# Patient Record
Sex: FEMALE | Race: WHITE | NOT HISPANIC OR LATINO | URBAN - METROPOLITAN AREA
[De-identification: names, ages, dates, MRNs, and addresses within clinical notes are randomized per-mention and may not be internally consistent; named-entity substitution may affect disease eponyms.]

---

## 2017-05-20 ENCOUNTER — EMERGENCY (EMERGENCY)
Facility: HOSPITAL | Age: 72
LOS: 1 days | Discharge: HOME | End: 2017-05-20
Attending: EMERGENCY MEDICINE | Admitting: EMERGENCY MEDICINE
Payer: MEDICARE

## 2017-05-20 VITALS
HEART RATE: 63 BPM | SYSTOLIC BLOOD PRESSURE: 129 MMHG | TEMPERATURE: 98 F | DIASTOLIC BLOOD PRESSURE: 64 MMHG | RESPIRATION RATE: 16 BRPM | OXYGEN SATURATION: 99 %

## 2017-05-20 VITALS
OXYGEN SATURATION: 99 % | DIASTOLIC BLOOD PRESSURE: 86 MMHG | HEART RATE: 57 BPM | RESPIRATION RATE: 17 BRPM | TEMPERATURE: 98 F | SYSTOLIC BLOOD PRESSURE: 117 MMHG

## 2017-05-20 DIAGNOSIS — R10.13 EPIGASTRIC PAIN: ICD-10-CM

## 2017-05-20 DIAGNOSIS — Z88.8 ALLERGY STATUS TO OTHER DRUGS, MEDICAMENTS AND BIOLOGICAL SUBSTANCES STATUS: ICD-10-CM

## 2017-05-20 DIAGNOSIS — F41.9 ANXIETY DISORDER, UNSPECIFIED: ICD-10-CM

## 2017-05-20 DIAGNOSIS — Z79.899 OTHER LONG TERM (CURRENT) DRUG THERAPY: ICD-10-CM

## 2017-05-20 LAB
ALBUMIN SERPL ELPH-MCNC: 4.5 G/DL — SIGNIFICANT CHANGE UP (ref 3.3–5)
ALP SERPL-CCNC: 77 U/L — SIGNIFICANT CHANGE UP (ref 40–120)
ALT FLD-CCNC: 14 U/L RC — SIGNIFICANT CHANGE UP (ref 10–45)
ANION GAP SERPL CALC-SCNC: 15 MMOL/L — SIGNIFICANT CHANGE UP (ref 5–17)
APTT BLD: 28.5 SEC — SIGNIFICANT CHANGE UP (ref 27.5–37.4)
AST SERPL-CCNC: 23 U/L — SIGNIFICANT CHANGE UP (ref 10–40)
BASOPHILS # BLD AUTO: 0 K/UL — SIGNIFICANT CHANGE UP (ref 0–0.2)
BASOPHILS NFR BLD AUTO: 0.6 % — SIGNIFICANT CHANGE UP (ref 0–2)
BILIRUB SERPL-MCNC: 0.3 MG/DL — SIGNIFICANT CHANGE UP (ref 0.2–1.2)
BUN SERPL-MCNC: 13 MG/DL — SIGNIFICANT CHANGE UP (ref 7–23)
CALCIUM SERPL-MCNC: 9.5 MG/DL — SIGNIFICANT CHANGE UP (ref 8.4–10.5)
CHLORIDE SERPL-SCNC: 105 MMOL/L — SIGNIFICANT CHANGE UP (ref 96–108)
CO2 SERPL-SCNC: 25 MMOL/L — SIGNIFICANT CHANGE UP (ref 22–31)
CREAT SERPL-MCNC: 0.73 MG/DL — SIGNIFICANT CHANGE UP (ref 0.5–1.3)
EOSINOPHIL # BLD AUTO: 0.1 K/UL — SIGNIFICANT CHANGE UP (ref 0–0.5)
EOSINOPHIL NFR BLD AUTO: 1.5 % — SIGNIFICANT CHANGE UP (ref 0–6)
GAS PNL BLDV: SIGNIFICANT CHANGE UP
GLUCOSE SERPL-MCNC: 103 MG/DL — HIGH (ref 70–99)
HCT VFR BLD CALC: 42.8 % — SIGNIFICANT CHANGE UP (ref 34.5–45)
HGB BLD-MCNC: 14.3 G/DL — SIGNIFICANT CHANGE UP (ref 11.5–15.5)
INR BLD: 1.14 RATIO — SIGNIFICANT CHANGE UP (ref 0.88–1.16)
LYMPHOCYTES # BLD AUTO: 1.4 K/UL — SIGNIFICANT CHANGE UP (ref 1–3.3)
LYMPHOCYTES # BLD AUTO: 25.6 % — SIGNIFICANT CHANGE UP (ref 13–44)
MCHC RBC-ENTMCNC: 30.2 PG — SIGNIFICANT CHANGE UP (ref 27–34)
MCHC RBC-ENTMCNC: 33.4 GM/DL — SIGNIFICANT CHANGE UP (ref 32–36)
MCV RBC AUTO: 90.4 FL — SIGNIFICANT CHANGE UP (ref 80–100)
MONOCYTES # BLD AUTO: 0.5 K/UL — SIGNIFICANT CHANGE UP (ref 0–0.9)
MONOCYTES NFR BLD AUTO: 8.6 % — SIGNIFICANT CHANGE UP (ref 2–14)
NEUTROPHILS # BLD AUTO: 3.5 K/UL — SIGNIFICANT CHANGE UP (ref 1.8–7.4)
NEUTROPHILS NFR BLD AUTO: 63.7 % — SIGNIFICANT CHANGE UP (ref 43–77)
PLATELET # BLD AUTO: 220 K/UL — SIGNIFICANT CHANGE UP (ref 150–400)
POTASSIUM SERPL-MCNC: 3.6 MMOL/L — SIGNIFICANT CHANGE UP (ref 3.5–5.3)
POTASSIUM SERPL-SCNC: 3.6 MMOL/L — SIGNIFICANT CHANGE UP (ref 3.5–5.3)
PROT SERPL-MCNC: 7.2 G/DL — SIGNIFICANT CHANGE UP (ref 6–8.3)
PROTHROM AB SERPL-ACNC: 12.5 SEC — SIGNIFICANT CHANGE UP (ref 9.8–12.7)
RBC # BLD: 4.74 M/UL — SIGNIFICANT CHANGE UP (ref 3.8–5.2)
RBC # FLD: 16.2 % — HIGH (ref 10.3–14.5)
SODIUM SERPL-SCNC: 145 MMOL/L — SIGNIFICANT CHANGE UP (ref 135–145)
WBC # BLD: 5.6 K/UL — SIGNIFICANT CHANGE UP (ref 3.8–10.5)
WBC # FLD AUTO: 5.6 K/UL — SIGNIFICANT CHANGE UP (ref 3.8–10.5)

## 2017-05-20 PROCEDURE — 84484 ASSAY OF TROPONIN QUANT: CPT

## 2017-05-20 PROCEDURE — 71020: CPT | Mod: 26

## 2017-05-20 PROCEDURE — 85014 HEMATOCRIT: CPT

## 2017-05-20 PROCEDURE — 82272 OCCULT BLD FECES 1-3 TESTS: CPT

## 2017-05-20 PROCEDURE — 99284 EMERGENCY DEPT VISIT MOD MDM: CPT

## 2017-05-20 PROCEDURE — 82330 ASSAY OF CALCIUM: CPT

## 2017-05-20 PROCEDURE — 83605 ASSAY OF LACTIC ACID: CPT

## 2017-05-20 PROCEDURE — 80053 COMPREHEN METABOLIC PANEL: CPT

## 2017-05-20 PROCEDURE — 85610 PROTHROMBIN TIME: CPT

## 2017-05-20 PROCEDURE — 71046 X-RAY EXAM CHEST 2 VIEWS: CPT

## 2017-05-20 PROCEDURE — 82435 ASSAY OF BLOOD CHLORIDE: CPT

## 2017-05-20 PROCEDURE — 84132 ASSAY OF SERUM POTASSIUM: CPT

## 2017-05-20 PROCEDURE — 84295 ASSAY OF SERUM SODIUM: CPT

## 2017-05-20 PROCEDURE — 85730 THROMBOPLASTIN TIME PARTIAL: CPT

## 2017-05-20 PROCEDURE — 82803 BLOOD GASES ANY COMBINATION: CPT

## 2017-05-20 PROCEDURE — 85027 COMPLETE CBC AUTOMATED: CPT

## 2017-05-20 PROCEDURE — 99283 EMERGENCY DEPT VISIT LOW MDM: CPT | Mod: 25

## 2017-05-20 PROCEDURE — 82947 ASSAY GLUCOSE BLOOD QUANT: CPT

## 2017-05-20 RX ORDER — ONDANSETRON 8 MG/1
1 TABLET, FILM COATED ORAL
Qty: 6 | Refills: 0
Start: 2017-05-20 | End: 2017-05-22

## 2017-05-20 NOTE — ED PROVIDER NOTE - MEDICAL DECISION MAKING DETAILS
adult female with mild intermittent abd cramping sp endoscopy no pain at present No ttp on exam,  check xray labs re-zulma Mendez MD, Facep

## 2017-05-20 NOTE — ED ADULT NURSE NOTE - OBJECTIVE STATEMENT
70 y/o F to ED by EMS c/o intermittent abd cramping since endoscopy on Wednesday.   A&Ox4.  No dizziness. +general malaise.  Afebrile.  Easy work of breathing.   Lungs clear and equal to auscultation.  No chest pain, numbness/tingling, edema.  Med lock 18 R AC placed.  Abd soft round, no guarding or grimacing on palpation.  No provocation/palliation of cramping pain able to be identified.  Intermittent nausea present.  No vomiting/diarrhea.  pt denies dark or bloody stools.  VSS.  NAD.  Safety maintained.  Will continue to monitor.

## 2017-05-20 NOTE — ED PROVIDER NOTE - PLAN OF CARE
1. Return to ED for worsening, progressive or any other concerning symptoms   2. Follow up with your primary care doctor in 2-3days as well as your gastroenterologist   3. Take Tylenol up to 650 mg every 6 hours as needed for pain.

## 2017-05-20 NOTE — ED PROVIDER NOTE - CARE PLAN
Principal Discharge DX:	Abdominal cramping  Instructions for follow-up, activity and diet:	1. Return to ED for worsening, progressive or any other concerning symptoms   2. Follow up with your primary care doctor in 2-3days as well as your gastroenterologist   3. Take Tylenol up to 650 mg every 6 hours as needed for pain.

## 2017-05-20 NOTE — ED PROVIDER NOTE - OBJECTIVE STATEMENT
Private Physician Juliette Ko pcp, Gyn Helen Biggs. Gastro Juliette Angeles ([facility])  71y female pmh anxiety on kloinpin, Pt sp episode of UGI bleed, 2m ago in [place],. with transfusion of 5uprbc, Had follow up with gi in [place] (Karthik). Had upper endoscopy 3d ago, "looked good" neg of H.Pylori, Pt comes to ed complains of nausea no vomiting. No diarrhea. with crampy abd pain. No bloody stool. No fever chills No cough/sob/chest pain. No loc, no palps,ha,dizzynees,loc. +Weak and fatigued, No pain at present.

## 2017-05-20 NOTE — ED PROVIDER NOTE - PROGRESS NOTE DETAILS
rectal: external hemorrhoids, no thrombosis, stool brown, guiaic negative. will check basic labs, EKG, CXR, patient well appearing and in no pain now. if all wnl will d/c -Slowey DO patient states she was mainly concerned about weakness and diaphoresis. no CP/SOB. EKG wnl, will check dTrop and plan to D/C -Slowey DO spoke with Dr. Huynh, who has read through patients charts, patient very anxious and multiple calls/visits for nausea/cramps and concern for GI bleeding due to history of bleeding in past in [place]. endoscopy- multiple biopsies but no ulcers or active bleeding. Dr Huynh will send message to Dr Angeles and her internist as well for Follow up -Wilman SEQUEIRA HEART score 1, trop negative will D/C -Slowey DO

## 2018-04-12 ENCOUNTER — APPOINTMENT (OUTPATIENT)
Dept: OBGYN | Facility: CLINIC | Age: 73
End: 2018-04-12

## 2018-05-05 ENCOUNTER — EMERGENCY (EMERGENCY)
Facility: HOSPITAL | Age: 73
LOS: 1 days | Discharge: HOME | End: 2018-05-05
Attending: EMERGENCY MEDICINE
Payer: MEDICARE

## 2018-05-05 VITALS
DIASTOLIC BLOOD PRESSURE: 47 MMHG | HEART RATE: 61 BPM | RESPIRATION RATE: 16 BRPM | TEMPERATURE: 98 F | OXYGEN SATURATION: 99 % | SYSTOLIC BLOOD PRESSURE: 132 MMHG

## 2018-05-05 VITALS
HEART RATE: 71 BPM | DIASTOLIC BLOOD PRESSURE: 88 MMHG | RESPIRATION RATE: 16 BRPM | SYSTOLIC BLOOD PRESSURE: 145 MMHG | OXYGEN SATURATION: 94 % | TEMPERATURE: 98 F

## 2018-05-05 PROCEDURE — 99283 EMERGENCY DEPT VISIT LOW MDM: CPT

## 2018-05-05 PROCEDURE — 99283 EMERGENCY DEPT VISIT LOW MDM: CPT | Mod: 25

## 2018-05-05 RX ORDER — DIPHENHYDRAMINE HCL 50 MG
50 CAPSULE ORAL ONCE
Qty: 0 | Refills: 0 | Status: COMPLETED | OUTPATIENT
Start: 2018-05-05 | End: 2018-05-05

## 2018-05-05 RX ORDER — HYDROCORTISONE 1 %
1 OINTMENT (GRAM) TOPICAL
Qty: 1 | Refills: 0
Start: 2018-05-05 | End: 2018-05-08

## 2018-05-05 NOTE — ED ADULT NURSE NOTE - OBJECTIVE STATEMENT
Patient with history of anxiety presents to the ED with c/o intermittent rashes and hives x6 weeks.  Patient took benadryl 25mg several weeks ago to some relief.  She said dermatologist who believed symptoms "were stress-induced".  Rash noted to chest.  Patient denies difficulty breathing, SOB, CP, difficulty swallowing or talking.  Patient is able to speak in full sentences and maintaining saliva.  Patient appears anxious.

## 2018-05-05 NOTE — ED ADULT NURSE REASSESSMENT NOTE - NS ED NURSE REASSESS COMMENT FT1
Patient took home dose of benadryl 25mg.  Patient states she "is sensitive to medications and does not want to take 50mg of benadryl".  ED Attending Yvonne aware of this.

## 2018-05-05 NOTE — ED PROVIDER NOTE - OBJECTIVE STATEMENT
72F, hx anxiety (klonopin) presents to ED with chief complaint of rash to upper chest. Per patient, has been having 6 weeks of intermittent pruritis across chest and intermittently to varying areas of body. No rash was initially seen. Saw dermatologist a few weeks ago who began the patient on allegra, which was then switched to claritin for 2 weeks. Patient said it had some help on pruritis but not much. Patient then states that she developed a pruritic red raised rash to her upper chest 3 days ago which has gotten progressively worse. Took one dose of claritin at home but became anxious and thought she was having palpitations so came to hospital. Patient currently reports no fevers or chills, nausea or vomiting, headache, dizziness, lightheadedness, chest pain, palpitations, shortness of breath, abdominal pain, weakness, urinary sx, cough, congestion. Patient states she feels very anxious. Patient was afraid to take any benadryl at home due to having taken zyrtec, so came to ED for eval.

## 2018-05-05 NOTE — ED PROVIDER NOTE - MEDICAL DECISION MAKING DETAILS
72F, presents with hx of 6 weeks pruritis, 3 days rash to upper chest. Exam as above. Offered patient benadryl in ED, refused. Will discharge home with Derm f/u, hydrocortisone cream, instructions to hernán benadryl. Currently stable, no acute distress. Will continue to follow up and re-assess. Case discussed with Attending: Yvonne Kim MD, PGY1

## 2018-05-05 NOTE — ED PROVIDER NOTE - ATTENDING CONTRIBUTION TO CARE
I was physically present for the E/M service provided. I agree with above history, physical, and plan which I have reviewed and edited where appropriate. I was physically present for the key portions of the service provided.    72F p/w pruritic rash to torso for which she follows with dermatology. No airway I was physically present for the E/M service provided. I agree with above history, physical, and plan which I have reviewed and edited where appropriate. I was physically present for the key portions of the service provided.    72F p/w pruritic rash to torso for which she follows with dermatology. No airway complaints. Afebrile. Comfortable respirations on RA. Lungs CTA. Scattered macular-papular rash to torso. Oropharynx clear. No rash on palms, soles or mouth. Pt states improvement with benadryl in past. Advised to resume. f/u appt w/ Derm on Monday as scheduled.

## 2018-05-05 NOTE — ED ADULT NURSE NOTE - CHPI ED SYMPTOMS NEG
no fever/no decreased eating/drinking/no scaly patches on skin/no bruising/no inflammation/no pain/no vomiting

## 2018-08-08 PROBLEM — L98.499 NON-PRESSURE CHRONIC ULCER OF SKIN OF OTHER SITES WITH UNSPECIFIED SEVERITY: Chronic | Status: ACTIVE | Noted: 2017-05-20

## 2018-08-08 PROBLEM — F41.9 ANXIETY DISORDER, UNSPECIFIED: Chronic | Status: ACTIVE | Noted: 2018-05-05

## 2018-08-14 ENCOUNTER — APPOINTMENT (OUTPATIENT)
Dept: UROGYNECOLOGY | Facility: CLINIC | Age: 73
End: 2018-08-14
Payer: MEDICARE

## 2018-08-14 PROCEDURE — 99214 OFFICE O/P EST MOD 30 MIN: CPT

## 2018-08-28 ENCOUNTER — APPOINTMENT (OUTPATIENT)
Dept: UROGYNECOLOGY | Facility: CLINIC | Age: 73
End: 2018-08-28
Payer: MEDICARE

## 2018-08-28 PROCEDURE — 99213 OFFICE O/P EST LOW 20 MIN: CPT

## 2018-09-03 ENCOUNTER — MOBILE ON CALL (OUTPATIENT)
Age: 73
End: 2018-09-03

## 2018-09-12 ENCOUNTER — APPOINTMENT (OUTPATIENT)
Dept: UROGYNECOLOGY | Facility: CLINIC | Age: 73
End: 2018-09-12
Payer: MEDICARE

## 2018-09-12 PROCEDURE — 99212 OFFICE O/P EST SF 10 MIN: CPT | Mod: GC

## 2018-10-02 ENCOUNTER — MOBILE ON CALL (OUTPATIENT)
Age: 73
End: 2018-10-02

## 2018-10-02 ENCOUNTER — MESSAGE (OUTPATIENT)
Age: 73
End: 2018-10-02

## 2018-10-03 ENCOUNTER — APPOINTMENT (OUTPATIENT)
Dept: UROGYNECOLOGY | Facility: CLINIC | Age: 73
End: 2018-10-03
Payer: MEDICARE

## 2018-10-03 DIAGNOSIS — R33.9 RETENTION OF URINE, UNSPECIFIED: ICD-10-CM

## 2018-10-03 LAB
BILIRUB UR QL STRIP: NORMAL
CLARITY UR: CLEAR
COLLECTION METHOD: NORMAL
GLUCOSE UR-MCNC: NORMAL
HCG UR QL: 0.2 EU/DL
HGB UR QL STRIP.AUTO: NORMAL
KETONES UR-MCNC: NORMAL
LEUKOCYTE ESTERASE UR QL STRIP: NORMAL
NITRITE UR QL STRIP: NORMAL
PH UR STRIP: 7
PROT UR STRIP-MCNC: NORMAL
SP GR UR STRIP: 1.01

## 2018-10-03 PROCEDURE — 99214 OFFICE O/P EST MOD 30 MIN: CPT

## 2018-10-05 ENCOUNTER — MESSAGE (OUTPATIENT)
Age: 73
End: 2018-10-05

## 2018-10-05 LAB — BACTERIA UR CULT: NORMAL

## 2018-10-09 ENCOUNTER — APPOINTMENT (OUTPATIENT)
Dept: UROGYNECOLOGY | Facility: CLINIC | Age: 73
End: 2018-10-09
Payer: MEDICARE

## 2018-10-09 PROCEDURE — 99215 OFFICE O/P EST HI 40 MIN: CPT

## 2018-12-12 ENCOUNTER — APPOINTMENT (OUTPATIENT)
Dept: UROGYNECOLOGY | Facility: CLINIC | Age: 73
End: 2018-12-12
Payer: MEDICARE

## 2018-12-12 LAB
BILIRUB UR QL STRIP: NORMAL
CLARITY UR: CLEAR
COLLECTION METHOD: NORMAL
GLUCOSE UR-MCNC: NORMAL
HCG UR QL: 0.2 EU/DL
HGB UR QL STRIP.AUTO: NORMAL
KETONES UR-MCNC: NORMAL
LEUKOCYTE ESTERASE UR QL STRIP: NORMAL
NITRITE UR QL STRIP: NORMAL
PH UR STRIP: 5.5
PROT UR STRIP-MCNC: NORMAL
SP GR UR STRIP: 1.01

## 2018-12-12 PROCEDURE — 99213 OFFICE O/P EST LOW 20 MIN: CPT

## 2018-12-21 ENCOUNTER — MESSAGE (OUTPATIENT)
Age: 73
End: 2018-12-21

## 2019-01-09 ENCOUNTER — EMERGENCY (EMERGENCY)
Facility: HOSPITAL | Age: 74
LOS: 1 days | Discharge: HOME | End: 2019-01-09
Attending: EMERGENCY MEDICINE
Payer: MEDICARE

## 2019-01-09 VITALS
OXYGEN SATURATION: 98 % | TEMPERATURE: 98 F | HEART RATE: 62 BPM | RESPIRATION RATE: 17 BRPM | DIASTOLIC BLOOD PRESSURE: 82 MMHG | SYSTOLIC BLOOD PRESSURE: 128 MMHG

## 2019-01-09 VITALS
TEMPERATURE: 99 F | WEIGHT: 119.93 LBS | DIASTOLIC BLOOD PRESSURE: 85 MMHG | SYSTOLIC BLOOD PRESSURE: 144 MMHG | HEART RATE: 80 BPM | OXYGEN SATURATION: 98 % | HEIGHT: 66 IN | RESPIRATION RATE: 18 BRPM

## 2019-01-09 LAB
ALBUMIN SERPL ELPH-MCNC: 4.6 G/DL — SIGNIFICANT CHANGE UP (ref 3.3–5)
ALP SERPL-CCNC: 91 U/L — SIGNIFICANT CHANGE UP (ref 40–120)
ALT FLD-CCNC: 14 U/L — SIGNIFICANT CHANGE UP (ref 10–45)
ANION GAP SERPL CALC-SCNC: 13 MMOL/L — SIGNIFICANT CHANGE UP (ref 5–17)
APTT BLD: 28.8 SEC — SIGNIFICANT CHANGE UP (ref 27.5–36.3)
AST SERPL-CCNC: 18 U/L — SIGNIFICANT CHANGE UP (ref 10–40)
BASOPHILS # BLD AUTO: 0 K/UL — SIGNIFICANT CHANGE UP (ref 0–0.2)
BASOPHILS NFR BLD AUTO: 0.4 % — SIGNIFICANT CHANGE UP (ref 0–2)
BILIRUB SERPL-MCNC: 0.4 MG/DL — SIGNIFICANT CHANGE UP (ref 0.2–1.2)
BUN SERPL-MCNC: 14 MG/DL — SIGNIFICANT CHANGE UP (ref 7–23)
CALCIUM SERPL-MCNC: 9.8 MG/DL — SIGNIFICANT CHANGE UP (ref 8.4–10.5)
CHLORIDE SERPL-SCNC: 101 MMOL/L — SIGNIFICANT CHANGE UP (ref 96–108)
CO2 SERPL-SCNC: 24 MMOL/L — SIGNIFICANT CHANGE UP (ref 22–31)
CREAT SERPL-MCNC: 0.61 MG/DL — SIGNIFICANT CHANGE UP (ref 0.5–1.3)
EOSINOPHIL # BLD AUTO: 0.2 K/UL — SIGNIFICANT CHANGE UP (ref 0–0.5)
EOSINOPHIL NFR BLD AUTO: 4 % — SIGNIFICANT CHANGE UP (ref 0–6)
GLUCOSE SERPL-MCNC: 96 MG/DL — SIGNIFICANT CHANGE UP (ref 70–99)
HCT VFR BLD CALC: 41.4 % — SIGNIFICANT CHANGE UP (ref 34.5–45)
HGB BLD-MCNC: 14.5 G/DL — SIGNIFICANT CHANGE UP (ref 11.5–15.5)
INR BLD: 1.08 RATIO — SIGNIFICANT CHANGE UP (ref 0.88–1.16)
LYMPHOCYTES # BLD AUTO: 1.5 K/UL — SIGNIFICANT CHANGE UP (ref 1–3.3)
LYMPHOCYTES # BLD AUTO: 25.2 % — SIGNIFICANT CHANGE UP (ref 13–44)
MCHC RBC-ENTMCNC: 32.8 PG — SIGNIFICANT CHANGE UP (ref 27–34)
MCHC RBC-ENTMCNC: 35 GM/DL — SIGNIFICANT CHANGE UP (ref 32–36)
MCV RBC AUTO: 93.8 FL — SIGNIFICANT CHANGE UP (ref 80–100)
MONOCYTES # BLD AUTO: 0.4 K/UL — SIGNIFICANT CHANGE UP (ref 0–0.9)
MONOCYTES NFR BLD AUTO: 6.8 % — SIGNIFICANT CHANGE UP (ref 2–14)
NEUTROPHILS # BLD AUTO: 3.8 K/UL — SIGNIFICANT CHANGE UP (ref 1.8–7.4)
NEUTROPHILS NFR BLD AUTO: 63.6 % — SIGNIFICANT CHANGE UP (ref 43–77)
PLATELET # BLD AUTO: 197 K/UL — SIGNIFICANT CHANGE UP (ref 150–400)
POTASSIUM SERPL-MCNC: 4.1 MMOL/L — SIGNIFICANT CHANGE UP (ref 3.5–5.3)
POTASSIUM SERPL-SCNC: 4.1 MMOL/L — SIGNIFICANT CHANGE UP (ref 3.5–5.3)
PROT SERPL-MCNC: 7.2 G/DL — SIGNIFICANT CHANGE UP (ref 6–8.3)
PROTHROM AB SERPL-ACNC: 12.3 SEC — SIGNIFICANT CHANGE UP (ref 10–12.9)
RBC # BLD: 4.42 M/UL — SIGNIFICANT CHANGE UP (ref 3.8–5.2)
RBC # FLD: 12.3 % — SIGNIFICANT CHANGE UP (ref 10.3–14.5)
SODIUM SERPL-SCNC: 138 MMOL/L — SIGNIFICANT CHANGE UP (ref 135–145)
TROPONIN T, HIGH SENSITIVITY RESULT: 11 NG/L — SIGNIFICANT CHANGE UP (ref 0–51)
TROPONIN T, HIGH SENSITIVITY RESULT: 12 NG/L — SIGNIFICANT CHANGE UP (ref 0–51)
WBC # BLD: 6 K/UL — SIGNIFICANT CHANGE UP (ref 3.8–10.5)
WBC # FLD AUTO: 6 K/UL — SIGNIFICANT CHANGE UP (ref 3.8–10.5)

## 2019-01-09 PROCEDURE — 93005 ELECTROCARDIOGRAM TRACING: CPT

## 2019-01-09 PROCEDURE — 99283 EMERGENCY DEPT VISIT LOW MDM: CPT | Mod: 25

## 2019-01-09 PROCEDURE — 85610 PROTHROMBIN TIME: CPT

## 2019-01-09 PROCEDURE — 85027 COMPLETE CBC AUTOMATED: CPT

## 2019-01-09 PROCEDURE — 71045 X-RAY EXAM CHEST 1 VIEW: CPT

## 2019-01-09 PROCEDURE — 93010 ELECTROCARDIOGRAM REPORT: CPT

## 2019-01-09 PROCEDURE — 99285 EMERGENCY DEPT VISIT HI MDM: CPT | Mod: 25

## 2019-01-09 PROCEDURE — 71045 X-RAY EXAM CHEST 1 VIEW: CPT | Mod: 26

## 2019-01-09 PROCEDURE — 84484 ASSAY OF TROPONIN QUANT: CPT

## 2019-01-09 PROCEDURE — 85730 THROMBOPLASTIN TIME PARTIAL: CPT

## 2019-01-09 PROCEDURE — 80053 COMPREHEN METABOLIC PANEL: CPT

## 2019-01-09 RX ORDER — CLONAZEPAM 1 MG
0.25 TABLET ORAL ONCE
Qty: 0 | Refills: 0 | Status: DISCONTINUED | OUTPATIENT
Start: 2019-01-09 | End: 2019-01-09

## 2019-01-09 RX ORDER — ASPIRIN/CALCIUM CARB/MAGNESIUM 324 MG
325 TABLET ORAL ONCE
Qty: 0 | Refills: 0 | Status: COMPLETED | OUTPATIENT
Start: 2019-01-09 | End: 2019-01-09

## 2019-01-09 NOTE — ED PROVIDER NOTE - MEDICAL DECISION MAKING DETAILS
kenan martinez anxiety with atypical cp since yesterday episodic low heart score - ekg without st changes - low suspicion for cardiac will - tele onitor and send trop and reeval

## 2019-01-09 NOTE — ED PROVIDER NOTE - CARDIAC, MLM
Normal rate, regular rhythm.  Heart sounds S1, S2.  No murmurs, rubs or gallops.  No chest wall tenderness, no pedal edema

## 2019-01-09 NOTE — ED PROVIDER NOTE - PROGRESS NOTE DETAILS
delta trop neg - pt symptom free instructed to follow up with cardiologist pcp and possible outpt stress

## 2019-01-09 NOTE — ED ADULT NURSE NOTE - NSIMPLEMENTINTERV_GEN_ALL_ED
Implemented All Universal Safety Interventions:  [place] to call system. Call bell, personal items and telephone within reach. Instruct patient to call for assistance. Room bathroom lighting operational. Non-slip footwear when patient is off stretcher. Physically safe environment: no spills, clutter or unnecessary equipment. Stretcher in lowest position, wheels locked, appropriate side rails in place.

## 2019-01-09 NOTE — ED ADULT NURSE NOTE - OBJECTIVE STATEMENT
73 yr old female with  from home with c/o R sided chest discomfort on/off since 3 pm yesterday while at grocery store, similar symptoms while sitting in waiting room with associated L sided chest discomfort, +admits to hx anxiety ("I get dizzy nausea, lightheaded when I'm anxious"), at present clear lungs, skin wdi, NSR on monitor,  present

## 2019-01-09 NOTE — ED ADULT TRIAGE NOTE - CHIEF COMPLAINT QUOTE
undergoing treatment for anxiety. Pt reports that she is having right sided chest discomfort intermittent since yesterday. Pt denies any chest discomfort currently. "I wanted to be checked out because I was nervous something else was wrong"

## 2019-01-09 NOTE — ED PROVIDER NOTE - PHYSICAL EXAMINATION
[id number] nadncatperrleomi s1s2 rrrctabl no rash strgth 5/5uele bl no c/c/e - no chest wall ttp abd soft nt

## 2019-01-09 NOTE — ED PROVIDER NOTE - OBJECTIVE STATEMENT
kenan 73 f w ho anxiety w seconds of rt sided sticking pain yesterday with activity - today multiple epsiodes of sticking pain on left at rest no ho cad, borderline inc chol, norm stress in distant past - no assoc sob no radiation of pain no pain ined but previously had epsiodes in waiting room - pt said similar s/s w anxiety attack in past kenan 73 f w ho anxiety w seconds of rt sided sticking pain yesterday with activity - today multiple epsiodes of sticking pain on left at rest no ho cad, borderline inc chol, norm stress in distant past - no assoc sob no radiation of pain no pain ined but previously had epsiodes in waiting room - pt said similar s/s w anxiety attack in past    74yo F with pmhx of Anxiety, MALT lymphoma treated with radiation 2 years ago presents to ER c/o of right sided chest pain x2days.  Patient reports symptoms first started while she was walking around [employer]- began feeling sticking pain to right chest reports lasted a few seconds and resolved on its own.  Reports throughout the day had multiple episodes as she was just sitting down, while sitting in Triage waiting room had an episode to her left chest.  Patient admits symptoms are different from her usually anxiety symptoms as she usually feels dizziness, palpitations, HA and sweaty. Admits to have recent stressors. Denies associated SOB, dizziness, cough, nasal congestion, leg swelling, smoking, recent travel.

## 2019-01-09 NOTE — ED PROVIDER NOTE - GASTROINTESTINAL NEGATIVE STATEMENT, MLM
Patient calls earlier, leaves a message, and asks if she can have an x-ray today prior to her appointment tomorrow with Marianne?      Writer called back and patient was unavailable, so writer spoke with her  and he states that she injured her left foot and has bruising near the toes and up the foot.  She would like an x-ray done prior so results are available at time of appointment.   is notified that LUI Sterling is not in the office today however patient could come to clinic 15-20 minutes early tomorrow if she would like.   agrees to the same and PCP will be updated.          
no abdominal pain,  no nausea and no vomiting.

## 2019-02-08 ENCOUNTER — MOBILE ON CALL (OUTPATIENT)
Age: 74
End: 2019-02-08

## 2019-02-12 ENCOUNTER — MESSAGE (OUTPATIENT)
Age: 74
End: 2019-02-12

## 2019-03-06 ENCOUNTER — APPOINTMENT (OUTPATIENT)
Dept: UROGYNECOLOGY | Facility: CLINIC | Age: 74
End: 2019-03-06
Payer: MEDICARE

## 2019-03-06 PROCEDURE — 99213 OFFICE O/P EST LOW 20 MIN: CPT

## 2019-03-07 NOTE — DISCUSSION/SUMMARY
[FreeTextEntry1] : Pt doing well with pessary. I advised pt can use Surgilube as a lubricant.  She has not had any reaction when it was used in office for pessary care.  She will RTO in 3 months or sooner if needed. Pt agrees to call office with any problems/concerns.

## 2019-03-07 NOTE — PROCEDURE
[Good Fit] : fits well [Pessary Washed] : washed [H2O] : H2O [None] : no bleeding [Refit] : refit is not needed [Erosion] : no evidence of erosion [Erythema] : no erythema [Discharge] : no vaginal discharge [Infection] : no evidence of infection [FreeTextEntry1] : Ring with support #6 [FreeTextEntry8] : routine jonathan-care

## 2019-03-07 NOTE — HISTORY OF PRESENT ILLNESS
[FreeTextEntry1] : Pt presents to office for f/u of prolapse. Denies any issues with pessary.  Developed allergic reaction to vaginal estrogen ( vomiting and chills, and increase in anxiety) and OTC lubricant, Replens.

## 2019-03-26 ENCOUNTER — RESULT REVIEW (OUTPATIENT)
Age: 74
End: 2019-03-26

## 2019-03-26 ENCOUNTER — APPOINTMENT (OUTPATIENT)
Dept: OBGYN | Facility: CLINIC | Age: 74
End: 2019-03-26
Payer: MEDICARE

## 2019-03-26 PROCEDURE — 99387 INIT PM E/M NEW PAT 65+ YRS: CPT

## 2019-06-26 ENCOUNTER — APPOINTMENT (OUTPATIENT)
Dept: UROGYNECOLOGY | Facility: CLINIC | Age: 74
End: 2019-06-26
Payer: MEDICARE

## 2019-06-26 PROCEDURE — 99213 OFFICE O/P EST LOW 20 MIN: CPT

## 2019-06-28 NOTE — PHYSICAL EXAM
[No Acute Distress] : in no acute distress [Well developed] : well developed [Well Nourished] : ~L well nourished [Good Hygeine] : demonstrates good hygeine [Oriented x3] : oriented to person, place, and time [Normal Memory] : ~T memory was ~L unimpaired [Normal Mood/Affect] : mood and affect are normal [Warm and Dry] : was warm and dry to touch [Normal Gait] : gait was normal [Labia Majora] : were normal [Labia Minora] : were normal [Normal Appearance] : general appearance was normal [Normal] : normal [No Bleeding] : there was no active vaginal bleeding [Atrophy] : atrophy [Tenderness] : ~T no ~M abdominal tenderness observed [Distended] : not distended [Anxiety] : patient is not anxious

## 2019-09-25 ENCOUNTER — APPOINTMENT (OUTPATIENT)
Dept: UROGYNECOLOGY | Facility: CLINIC | Age: 74
End: 2019-09-25
Payer: MEDICARE

## 2019-09-25 PROCEDURE — 99213 OFFICE O/P EST LOW 20 MIN: CPT

## 2019-09-25 NOTE — PHYSICAL EXAM
[No Acute Distress] : in no acute distress [Well developed] : well developed [Well Nourished] : ~L well nourished [Good Hygeine] : demonstrates good hygeine [Normal Memory] : ~T memory was ~L unimpaired [Oriented x3] : oriented to person, place, and time [Anxiety] : patient is not anxious [Normal Mood/Affect] : mood and affect are normal [Distended] : not distended [Tenderness] : ~T no ~M abdominal tenderness observed [Warm and Dry] : was warm and dry to touch [Normal Gait] : gait was normal [Labia Majora] : were normal [Labia Minora] : were normal [Normal Appearance] : general appearance was normal [Atrophy] : atrophy [No Bleeding] : there was no active vaginal bleeding [Normal] : normal

## 2019-09-25 NOTE — PROCEDURE
[Good Fit] : fits well [Erosion] : no evidence of erosion [Refit] : refit is not needed [Erythema] : no erythema [Discharge] : no vaginal discharge [Infection] : no evidence of infection [Pessary Washed] : washed [H2O] : H2O [None] : no bleeding [FreeTextEntry8] : routine jonathan-care [FreeTextEntry1] : RS #6

## 2020-01-08 ENCOUNTER — APPOINTMENT (OUTPATIENT)
Dept: UROGYNECOLOGY | Facility: CLINIC | Age: 75
End: 2020-01-08
Payer: MEDICARE

## 2020-01-08 PROCEDURE — 99213 OFFICE O/P EST LOW 20 MIN: CPT

## 2020-01-08 NOTE — PROCEDURE
[Good Fit] : fits well [Pessary Washed] : washed [H2O] : H2O [Mild] : mild bleeding [Resolved w/pressure] : was resolved by applying pressure [Refit] : refit is not needed [Erosion] : no evidence of erosion [Erythema] : no erythema [Infection] : no evidence of infection [Discharge] : no vaginal discharge [FreeTextEntry1] : RS #6 [de-identified] : with removal [FreeTextEntry8] : routine jonathan-care

## 2020-04-08 ENCOUNTER — APPOINTMENT (OUTPATIENT)
Dept: UROGYNECOLOGY | Facility: CLINIC | Age: 75
End: 2020-04-08

## 2020-05-29 ENCOUNTER — APPOINTMENT (OUTPATIENT)
Dept: UROGYNECOLOGY | Facility: CLINIC | Age: 75
End: 2020-05-29
Payer: MEDICARE

## 2020-05-29 PROCEDURE — 99213 OFFICE O/P EST LOW 20 MIN: CPT

## 2020-06-01 NOTE — PHYSICAL EXAM
[No Acute Distress] : in no acute distress [Well developed] : well developed [Well Nourished] : ~L well nourished [Good Hygeine] : demonstrates good hygeine [Oriented x3] : oriented to person, place, and time [Normal Memory] : ~T memory was ~L unimpaired [Normal Mood/Affect] : mood and affect are normal [Warm and Dry] : was warm and dry to touch [Normal Gait] : gait was normal [Labia Majora] : were normal [Labia Minora] : were normal [Normal Appearance] : general appearance was normal [Atrophy] : atrophy [No Bleeding] : there was no active vaginal bleeding [Normal] : normal [Tenderness] : ~T no ~M abdominal tenderness observed [Anxiety] : patient is not anxious [Distended] : not distended

## 2020-06-01 NOTE — PROCEDURE
[Good Fit] : fits well [Pessary Washed] : washed [H2O] : H2O [Mild] : mild bleeding [Resolved w/pressure] : was resolved by applying pressure [Refit] : refit is not needed [Erosion] : no evidence of erosion [Erythema] : no erythema [Discharge] : no vaginal discharge [Infection] : no evidence of infection [FreeTextEntry1] : RS #6 [de-identified] : with removal [FreeTextEntry8] : routine jonathan-care

## 2020-06-03 ENCOUNTER — APPOINTMENT (OUTPATIENT)
Dept: CARDIOLOGY | Facility: CLINIC | Age: 75
End: 2020-06-03

## 2020-08-19 ENCOUNTER — APPOINTMENT (OUTPATIENT)
Dept: OPHTHALMOLOGY | Facility: CLINIC | Age: 75
End: 2020-08-19

## 2020-08-27 ENCOUNTER — APPOINTMENT (OUTPATIENT)
Dept: UROGYNECOLOGY | Facility: CLINIC | Age: 75
End: 2020-08-27
Payer: MEDICARE

## 2020-08-27 PROCEDURE — 99213 OFFICE O/P EST LOW 20 MIN: CPT

## 2020-08-31 NOTE — PROCEDURE
[Good Fit] : fits well [Pessary Washed] : washed [H2O] : H2O [Resolved w/pressure] : was resolved by applying pressure [Mild] : mild bleeding [Refit] : refit is not needed [Erythema] : no erythema [Erosion] : no evidence of erosion [Discharge] : no vaginal discharge [Infection] : no evidence of infection [FreeTextEntry1] : RS #6 [de-identified] : with removal [FreeTextEntry8] : routine jonathan-care

## 2020-12-29 ENCOUNTER — APPOINTMENT (OUTPATIENT)
Dept: UROGYNECOLOGY | Facility: CLINIC | Age: 75
End: 2020-12-29
Payer: MEDICARE

## 2020-12-29 VITALS — SYSTOLIC BLOOD PRESSURE: 110 MMHG | DIASTOLIC BLOOD PRESSURE: 70 MMHG | TEMPERATURE: 97.3 F | HEART RATE: 73 BPM

## 2020-12-29 PROCEDURE — 99213 OFFICE O/P EST LOW 20 MIN: CPT

## 2020-12-29 PROCEDURE — 99072 ADDL SUPL MATRL&STAF TM PHE: CPT

## 2020-12-29 RX ORDER — MUPIROCIN 20 MG/G
2 OINTMENT TOPICAL
Qty: 22 | Refills: 0 | Status: ACTIVE | COMMUNITY
Start: 2020-09-03

## 2020-12-29 RX ORDER — CLOBETASOL PROPIONATE 0.5 MG/ML
0.05 SOLUTION TOPICAL
Qty: 50 | Refills: 0 | Status: ACTIVE | COMMUNITY
Start: 2020-11-18

## 2020-12-29 NOTE — PROCEDURE
[Good Fit] : fits well [Pessary Washed] : washed [H2O] : H2O [None] : no bleeding [Refit] : refit is not needed [Erosion] : no evidence of erosion [Erythema] : no erythema [Discharge] : no vaginal discharge [Infection] : no evidence of infection [FreeTextEntry1] : RS #6 [FreeTextEntry8] : routine jonathan-care

## 2020-12-29 NOTE — DISCUSSION/SUMMARY
[FreeTextEntry1] : Pt doing well with pessary\par Patient will f/u in 3 months or sooner if needed \par Pt agrees to call office with any problems/concerns, verbalized understanding

## 2020-12-29 NOTE — HISTORY OF PRESENT ILLNESS
[FreeTextEntry1] : Pt presents to office for routine pessary maintenance. Denies any urinary or bowel complaints. Patient states she is happy with the pessary. Denies any issues with pessary.

## 2021-03-10 ENCOUNTER — NON-APPOINTMENT (OUTPATIENT)
Age: 76
End: 2021-03-10

## 2021-03-11 ENCOUNTER — APPOINTMENT (OUTPATIENT)
Dept: UROGYNECOLOGY | Facility: CLINIC | Age: 76
End: 2021-03-11

## 2021-04-21 ENCOUNTER — APPOINTMENT (OUTPATIENT)
Dept: UROGYNECOLOGY | Facility: CLINIC | Age: 76
End: 2021-04-21
Payer: MEDICARE

## 2021-04-21 VITALS
OXYGEN SATURATION: 98 % | TEMPERATURE: 97.5 F | HEART RATE: 91 BPM | SYSTOLIC BLOOD PRESSURE: 135 MMHG | DIASTOLIC BLOOD PRESSURE: 82 MMHG

## 2021-04-21 PROCEDURE — 99072 ADDL SUPL MATRL&STAF TM PHE: CPT

## 2021-04-21 PROCEDURE — 99213 OFFICE O/P EST LOW 20 MIN: CPT

## 2021-04-21 NOTE — DISCUSSION/SUMMARY
[FreeTextEntry1] : Pelvic Prolapse:\par -Continue with Ring with support # 6.\par -Precaution and instructions were reviewed.\par Pt doing well with pessary\par Patient will f/u in 3 months or sooner if needed \par Pt agrees to call office with any problems/concerns, verbalized understanding

## 2021-07-13 ENCOUNTER — APPOINTMENT (OUTPATIENT)
Dept: UROGYNECOLOGY | Facility: CLINIC | Age: 76
End: 2021-07-13
Payer: MEDICARE

## 2021-07-13 PROCEDURE — 99213 OFFICE O/P EST LOW 20 MIN: CPT

## 2021-07-13 PROCEDURE — 99072 ADDL SUPL MATRL&STAF TM PHE: CPT

## 2021-07-14 NOTE — HISTORY OF PRESENT ILLNESS
[FreeTextEntry1] : Amina, 74y/o presents to office for routine pelvic prolapse.  Supported with Ring with support # 6. Denies any urinary or bowel complaints. Patient states she is happy with the pessary. Denies any issues with pessary.\par \par A while back, pt felt the pessary shifted and she had some discomfort.  She felt issues with frequent urination but symptoms have gotten better and she does not have any issues and doing well.  Denies any frequency, urgency, dysuria, or blood in urine.

## 2021-07-14 NOTE — DISCUSSION/SUMMARY
[FreeTextEntry1] : Pelvic Prolapse:\par -Continue with Ring with support # 6.Pt doing well with pessary\par -Precaution and instructions were reviewed.\par \par Patient will f/u in 3 months or sooner if needed \par Pt agrees to call office with any problems/concerns, verbalized understanding.  PT and  aware there is service at night and over the weekend if she needs to speak with someone on call.

## 2021-10-20 ENCOUNTER — APPOINTMENT (OUTPATIENT)
Dept: UROGYNECOLOGY | Facility: CLINIC | Age: 76
End: 2021-10-20
Payer: MEDICARE

## 2021-10-20 PROCEDURE — 99213 OFFICE O/P EST LOW 20 MIN: CPT

## 2021-10-20 NOTE — DISCUSSION/SUMMARY
[FreeTextEntry1] : Pelvic Prolapse:\par -Continue with Ring with support # 6.Pt doing well with pessary\par -Precaution and instructions were reviewed.\par \par Patient will f/u in 3 months or sooner if needed \par Pt agrees to call office with any problems/concerns, verbalized understanding.

## 2021-10-20 NOTE — HISTORY OF PRESENT ILLNESS
[FreeTextEntry1] : Amina, 74y/o presents to office for follow up on pelvic prolapse.  Supported with Ring with support # 6. Denies any urinary or bowel complaints. Patient states she is happy with the pessary. Denies any issues with pessary.\par \par

## 2021-10-29 ENCOUNTER — APPOINTMENT (OUTPATIENT)
Dept: OPHTHALMOLOGY | Facility: CLINIC | Age: 76
End: 2021-10-29

## 2022-04-08 ENCOUNTER — APPOINTMENT (OUTPATIENT)
Dept: UROGYNECOLOGY | Facility: CLINIC | Age: 77
End: 2022-04-08
Payer: MEDICARE

## 2022-04-08 VITALS — TEMPERATURE: 97.3 F

## 2022-04-08 PROCEDURE — 99213 OFFICE O/P EST LOW 20 MIN: CPT

## 2022-04-08 NOTE — HISTORY OF PRESENT ILLNESS
[FreeTextEntry1] : Amina, 76y/o presents to office for follow up on pelvic prolapse.  Supported with Ring with support # 6. Denies any urinary or bowel complaints. Patient states she is happy with the pessary. Denies any issues with pessary.\par \par

## 2022-08-03 ENCOUNTER — APPOINTMENT (OUTPATIENT)
Dept: UROGYNECOLOGY | Facility: CLINIC | Age: 77
End: 2022-08-03

## 2022-08-03 PROCEDURE — 99213 OFFICE O/P EST LOW 20 MIN: CPT

## 2022-08-03 NOTE — DISCUSSION/SUMMARY
[FreeTextEntry1] : POP:\par - Remains satisfied with RS#6\par - Reviewed precautions, patient without complaints today\par \par RTO in 3 months or sooner, as needed

## 2022-08-03 NOTE — HISTORY OF PRESENT ILLNESS
[FreeTextEntry1] : Amina is a 75yo with POP supported with RS#6 who presents today for follow up. She denies any complaints and continues to be satisfied with pessary. She is without complaint today. Denies any vaginal bleeding, incomplete emptying, difficulty with bowel movement.

## 2022-08-03 NOTE — PROCEDURE
[Good Fit] : fits well [Pessary Inserted] : inserted [Pessary Washed] : washed [Refit] : refit is not needed [Erosion] : no evidence of erosion [Erythema] : no erythema [Discharge] : no vaginal discharge [FreeTextEntry1] : RS #6  [FreeTextEntry8] : Reviewed pessary precautions and maintenance. Reviewed pericare.

## 2022-08-03 NOTE — PHYSICAL EXAM
[Chaperone Present] : A chaperone was present in the examining room during all aspects of the physical examination [No Acute Distress] : in no acute distress [Well developed] : well developed [Well Nourished] : ~L well nourished [Warm and Dry] : was warm and dry to touch [Labia Majora] : were normal [Labia Minora] : were normal [Normal Appearance] : general appearance was normal [No Bleeding] : there was no active vaginal bleeding [Normal] : normal [Tenderness] : ~T no ~M abdominal tenderness observed

## 2022-11-22 ENCOUNTER — APPOINTMENT (OUTPATIENT)
Dept: UROGYNECOLOGY | Facility: CLINIC | Age: 77
End: 2022-11-22

## 2022-11-22 PROCEDURE — 99213 OFFICE O/P EST LOW 20 MIN: CPT

## 2023-04-18 ENCOUNTER — APPOINTMENT (OUTPATIENT)
Dept: UROGYNECOLOGY | Facility: CLINIC | Age: 78
End: 2023-04-18
Payer: MEDICARE

## 2023-04-18 PROCEDURE — 99213 OFFICE O/P EST LOW 20 MIN: CPT

## 2023-07-05 ENCOUNTER — INPATIENT (INPATIENT)
Facility: HOSPITAL | Age: 78
LOS: 3 days | Discharge: HOME | DRG: 312 | End: 2023-07-09
Attending: INTERNAL MEDICINE | Admitting: STUDENT IN AN ORGANIZED HEALTH CARE EDUCATION/TRAINING PROGRAM
Payer: MEDICARE

## 2023-07-05 VITALS
DIASTOLIC BLOOD PRESSURE: 70 MMHG | RESPIRATION RATE: 20 BRPM | HEIGHT: 67 IN | SYSTOLIC BLOOD PRESSURE: 160 MMHG | WEIGHT: 125 LBS | OXYGEN SATURATION: 99 % | TEMPERATURE: 98 F | HEART RATE: 67 BPM

## 2023-07-05 DIAGNOSIS — C85.99 NON-HODGKIN LYMPHOMA, UNSPECIFIED, EXTRANODAL AND SOLID ORGAN SITES: ICD-10-CM

## 2023-07-05 DIAGNOSIS — R55 SYNCOPE AND COLLAPSE: ICD-10-CM

## 2023-07-05 DIAGNOSIS — G30.9 ALZHEIMER'S DISEASE, UNSPECIFIED: ICD-10-CM

## 2023-07-05 DIAGNOSIS — R42 DIZZINESS AND GIDDINESS: ICD-10-CM

## 2023-07-05 LAB
ALBUMIN SERPL ELPH-MCNC: 4.6 G/DL — SIGNIFICANT CHANGE UP (ref 3.3–5)
ALP SERPL-CCNC: 88 U/L — SIGNIFICANT CHANGE UP (ref 40–120)
ALT FLD-CCNC: 22 U/L — SIGNIFICANT CHANGE UP (ref 10–45)
ANION GAP SERPL CALC-SCNC: 14 MMOL/L — SIGNIFICANT CHANGE UP (ref 5–17)
APPEARANCE UR: CLEAR — SIGNIFICANT CHANGE UP
AST SERPL-CCNC: 26 U/L — SIGNIFICANT CHANGE UP (ref 10–40)
BACTERIA # UR AUTO: NEGATIVE — SIGNIFICANT CHANGE UP
BASOPHILS # BLD AUTO: 0.02 K/UL — SIGNIFICANT CHANGE UP (ref 0–0.2)
BASOPHILS NFR BLD AUTO: 0.3 % — SIGNIFICANT CHANGE UP (ref 0–2)
BILIRUB SERPL-MCNC: 0.3 MG/DL — SIGNIFICANT CHANGE UP (ref 0.2–1.2)
BILIRUB UR-MCNC: NEGATIVE — SIGNIFICANT CHANGE UP
BUN SERPL-MCNC: 19 MG/DL — SIGNIFICANT CHANGE UP (ref 7–23)
CALCIUM SERPL-MCNC: 9.7 MG/DL — SIGNIFICANT CHANGE UP (ref 8.4–10.5)
CHLORIDE SERPL-SCNC: 104 MMOL/L — SIGNIFICANT CHANGE UP (ref 96–108)
CO2 SERPL-SCNC: 24 MMOL/L — SIGNIFICANT CHANGE UP (ref 22–31)
COLOR SPEC: COLORLESS — SIGNIFICANT CHANGE UP
CREAT SERPL-MCNC: 0.67 MG/DL — SIGNIFICANT CHANGE UP (ref 0.5–1.3)
DIFF PNL FLD: NEGATIVE — SIGNIFICANT CHANGE UP
EGFR: 91 ML/MIN/1.73M2 — SIGNIFICANT CHANGE UP
EOSINOPHIL # BLD AUTO: 0.07 K/UL — SIGNIFICANT CHANGE UP (ref 0–0.5)
EOSINOPHIL NFR BLD AUTO: 1.2 % — SIGNIFICANT CHANGE UP (ref 0–6)
EPI CELLS # UR: 0 /HPF — SIGNIFICANT CHANGE UP
GLUCOSE SERPL-MCNC: 103 MG/DL — HIGH (ref 70–99)
GLUCOSE UR QL: NEGATIVE — SIGNIFICANT CHANGE UP
HCT VFR BLD CALC: 44.4 % — SIGNIFICANT CHANGE UP (ref 34.5–45)
HGB BLD-MCNC: 14.8 G/DL — SIGNIFICANT CHANGE UP (ref 11.5–15.5)
HYALINE CASTS # UR AUTO: 0 /LPF — SIGNIFICANT CHANGE UP (ref 0–2)
IMM GRANULOCYTES NFR BLD AUTO: 0.3 % — SIGNIFICANT CHANGE UP (ref 0–0.9)
KETONES UR-MCNC: NEGATIVE — SIGNIFICANT CHANGE UP
LEUKOCYTE ESTERASE UR-ACNC: ABNORMAL
LYMPHOCYTES # BLD AUTO: 1.3 K/UL — SIGNIFICANT CHANGE UP (ref 1–3.3)
LYMPHOCYTES # BLD AUTO: 21.8 % — SIGNIFICANT CHANGE UP (ref 13–44)
MCHC RBC-ENTMCNC: 31.4 PG — SIGNIFICANT CHANGE UP (ref 27–34)
MCHC RBC-ENTMCNC: 33.3 GM/DL — SIGNIFICANT CHANGE UP (ref 32–36)
MCV RBC AUTO: 94.1 FL — SIGNIFICANT CHANGE UP (ref 80–100)
MONOCYTES # BLD AUTO: 0.48 K/UL — SIGNIFICANT CHANGE UP (ref 0–0.9)
MONOCYTES NFR BLD AUTO: 8 % — SIGNIFICANT CHANGE UP (ref 2–14)
NEUTROPHILS # BLD AUTO: 4.08 K/UL — SIGNIFICANT CHANGE UP (ref 1.8–7.4)
NEUTROPHILS NFR BLD AUTO: 68.4 % — SIGNIFICANT CHANGE UP (ref 43–77)
NITRITE UR-MCNC: NEGATIVE — SIGNIFICANT CHANGE UP
NRBC # BLD: 0 /100 WBCS — SIGNIFICANT CHANGE UP (ref 0–0)
PH UR: 5.5 — SIGNIFICANT CHANGE UP (ref 5–8)
PLATELET # BLD AUTO: 226 K/UL — SIGNIFICANT CHANGE UP (ref 150–400)
POTASSIUM SERPL-MCNC: 4.1 MMOL/L — SIGNIFICANT CHANGE UP (ref 3.5–5.3)
POTASSIUM SERPL-SCNC: 4.1 MMOL/L — SIGNIFICANT CHANGE UP (ref 3.5–5.3)
PROT SERPL-MCNC: 7.5 G/DL — SIGNIFICANT CHANGE UP (ref 6–8.3)
PROT UR-MCNC: NEGATIVE — SIGNIFICANT CHANGE UP
RBC # BLD: 4.72 M/UL — SIGNIFICANT CHANGE UP (ref 3.8–5.2)
RBC # FLD: 12.6 % — SIGNIFICANT CHANGE UP (ref 10.3–14.5)
RBC CASTS # UR COMP ASSIST: 0 /HPF — SIGNIFICANT CHANGE UP (ref 0–4)
SODIUM SERPL-SCNC: 142 MMOL/L — SIGNIFICANT CHANGE UP (ref 135–145)
SP GR SPEC: 1.01 — LOW (ref 1.01–1.02)
TROPONIN T, HIGH SENSITIVITY RESULT: 12 NG/L — SIGNIFICANT CHANGE UP (ref 0–51)
TROPONIN T, HIGH SENSITIVITY RESULT: 13 NG/L — SIGNIFICANT CHANGE UP (ref 0–51)
UROBILINOGEN FLD QL: NEGATIVE — SIGNIFICANT CHANGE UP
WBC # BLD: 5.97 K/UL — SIGNIFICANT CHANGE UP (ref 3.8–10.5)
WBC # FLD AUTO: 5.97 K/UL — SIGNIFICANT CHANGE UP (ref 3.8–10.5)
WBC UR QL: 2 /HPF — SIGNIFICANT CHANGE UP (ref 0–5)

## 2023-07-05 PROCEDURE — 70450 CT HEAD/BRAIN W/O DYE: CPT | Mod: 26,MA

## 2023-07-05 PROCEDURE — 99223 1ST HOSP IP/OBS HIGH 75: CPT

## 2023-07-05 PROCEDURE — 71045 X-RAY EXAM CHEST 1 VIEW: CPT | Mod: 26

## 2023-07-05 PROCEDURE — 99285 EMERGENCY DEPT VISIT HI MDM: CPT

## 2023-07-05 RX ORDER — ONDANSETRON 8 MG/1
4 TABLET, FILM COATED ORAL EVERY 8 HOURS
Refills: 0 | Status: DISCONTINUED | OUTPATIENT
Start: 2023-07-05 | End: 2023-07-09

## 2023-07-05 RX ORDER — ACETAMINOPHEN 500 MG
650 TABLET ORAL EVERY 6 HOURS
Refills: 0 | Status: DISCONTINUED | OUTPATIENT
Start: 2023-07-05 | End: 2023-07-09

## 2023-07-05 RX ORDER — METOCLOPRAMIDE HCL 10 MG
10 TABLET ORAL ONCE
Refills: 0 | Status: COMPLETED | OUTPATIENT
Start: 2023-07-05 | End: 2023-07-05

## 2023-07-05 RX ORDER — DONEPEZIL HYDROCHLORIDE 10 MG/1
10 TABLET, FILM COATED ORAL AT BEDTIME
Refills: 0 | Status: DISCONTINUED | OUTPATIENT
Start: 2023-07-05 | End: 2023-07-07

## 2023-07-05 RX ORDER — LANOLIN ALCOHOL/MO/W.PET/CERES
3 CREAM (GRAM) TOPICAL AT BEDTIME
Refills: 0 | Status: DISCONTINUED | OUTPATIENT
Start: 2023-07-05 | End: 2023-07-09

## 2023-07-05 RX ORDER — SODIUM CHLORIDE 9 MG/ML
1000 INJECTION INTRAMUSCULAR; INTRAVENOUS; SUBCUTANEOUS ONCE
Refills: 0 | Status: COMPLETED | OUTPATIENT
Start: 2023-07-05 | End: 2023-07-05

## 2023-07-05 RX ORDER — ENOXAPARIN SODIUM 100 MG/ML
40 INJECTION SUBCUTANEOUS EVERY 24 HOURS
Refills: 0 | Status: DISCONTINUED | OUTPATIENT
Start: 2023-07-05 | End: 2023-07-09

## 2023-07-05 RX ADMIN — SODIUM CHLORIDE 1000 MILLILITER(S): 9 INJECTION INTRAMUSCULAR; INTRAVENOUS; SUBCUTANEOUS at 12:58

## 2023-07-05 RX ADMIN — Medication 10 MILLIGRAM(S): at 12:58

## 2023-07-05 NOTE — H&P ADULT - NSICDXPASTSURGICALHX_GEN_ALL_CORE_FT
61 year old Male w/ PMHx of intellectual disability, and seizure disorder [facility] presenting w/ seizures.  Neurological exam non-focal.  CTH pending read, no grossly acute abnormalities noted.  Patient's seizures managed by his neurologist, on 3 AEDs.      Plan:  - f/u VPA levels, if subtherapeutic, can load and recheck levels  - f/u with patient's neurologist regarding seizure Hx and medication recommendations  - supportive care as per primary team PAST SURGICAL HISTORY:  No significant past surgical history

## 2023-07-05 NOTE — ED PROVIDER NOTE - PHYSICAL EXAMINATION
GEN: Patient awake alert NAD.   HEENT: normocephalic, atraumatic, EOMI, no scleral icterus, moist MM  CARDIAC: RRR, S1, S2, no murmur.   PULM: CTA B/L no wheeze, rhonchi, rales.   ABD: soft NT, ND, no rebound no guarding  MSK: Moving all extremities, no edema. 5/5 strength and full ROM in all extremities.     NEURO: A&Ox3, gait normal, no focal neurological deficits, CN 2-12 intact  SKIN: warm, dry, no rash.

## 2023-07-05 NOTE — ED PROVIDER NOTE - CLINICAL SUMMARY MEDICAL DECISION MAKING FREE TEXT BOX
78 yo F hx of HTN, anxiety, dementia, pw dizziness. Pt reports this AM when she was eating breakfast in seated position, she had episode of lightheadedness, weakness, and sweaty palms. pt's  at bedside states she has a similar episode a few months ago as well. Pt has never seen a cardiologist. No significant findings on exam. DDX includes but not limited to on cardiogenic vs neurogenic (pre)syncope, lower suspicion for electroyte disturbance, orthostatic hypotension. Will obtain cardiac labs, CT head, anticipate admission for further cardiac workup.

## 2023-07-05 NOTE — H&P ADULT - NSHPPHYSICALEXAM_GEN_ALL_CORE
Vital Signs Last 24 Hrs  T(C): 36.9 (05 Jul 2023 16:48), Max: 36.9 (05 Jul 2023 16:48)  T(F): 98.4 (05 Jul 2023 16:48), Max: 98.4 (05 Jul 2023 16:48)  HR: 70 (05 Jul 2023 16:48) (67 - 70)  BP: 111/52 (05 Jul 2023 16:48) (111/52 - 160/70)  BP(mean): --  RR: 18 (05 Jul 2023 16:48) (18 - 20)  SpO2: 96% (05 Jul 2023 16:48) (96% - 99%)    Parameters below as of 05 Jul 2023 16:48  Patient On (Oxygen Delivery Method): room air Vital Signs Last 24 Hrs  T(C): 36.9 (05 Jul 2023 16:48), Max: 36.9 (05 Jul 2023 16:48)  T(F): 98.4 (05 Jul 2023 16:48), Max: 98.4 (05 Jul 2023 16:48)  HR: 70 (05 Jul 2023 16:48) (67 - 70)  BP: 111/52 (05 Jul 2023 16:48) (111/52 - 160/70)  BP(mean): --  RR: 18 (05 Jul 2023 16:48) (18 - 20)  SpO2: 96% (05 Jul 2023 16:48) (96% - 99%)    Parameters below as of 05 Jul 2023 16:48  Patient On (Oxygen Delivery Method): room air    GENERAL: No acute distress, well-developed  HEAD:  Atraumatic, Normocephalic  ENT: EOMI, PERRLA, conjunctiva and sclera clear,  moist mucosa no pharyngeal erythema or exudates   NECK: supple , no JVD   CHEST/LUNG: Clear to auscultation bilaterally; No wheeze, equal breath sounds bilaterally   BACK: No spinal tenderness,  No CVA tenderness   HEART: Regular rate and rhythm; No murmurs, rubs, or gallops  ABDOMEN: Soft, Nontender, Nondistended; Bowel sounds present  EXTREMITIES:  No clubbing, cyanosis, or edema  MSK: No joint swelling or effusions, ROM intact   PSYCH: Normal behavior/affect  NEUROLOGY: AAOx3, non-focal, cranial nerves intact, motor 5/5  , SILT , finger to nose intact   SKIN: Normal color, No rashes or lesions

## 2023-07-05 NOTE — ED PROVIDER NOTE - OBJECTIVE STATEMENT
76 yo F hx of HTN, anxiety, dementia, pw dizziness. Pt reports this AM when she was eating breakfast in seated position, she had episode of lightheadedness, weakness, and sweaty palms. pt's  at bedside states she has a similar episode a few months ago as well. Pt has never seen a cardiologist.   In the ED, denies CP, palpitations, SOb, abd pain, urinary symptoms, fever chills.

## 2023-07-05 NOTE — H&P ADULT - NSHPLABSRESULTS_GEN_ALL_CORE
Labs personally reviewed:                          14.8   5.97  )-----------( 226      ( 2023 13:23 )             44.4     -    142  |  104  |  19  ----------------------------<  103<H>  4.1   |  24  |  0.67    Ca    9.7      2023 13:23    TPro  7.5  /  Alb  4.6  /  TBili  0.3  /  DBili  x   /  AST  26  /  ALT  22  /  AlkPhos  88  07-        LIVER FUNCTIONS - ( 2023 13:23 )  Alb: 4.6 g/dL / Pro: 7.5 g/dL / ALK PHOS: 88 U/L / ALT: 22 U/L / AST: 26 U/L / GGT: x             Urinalysis Basic - ( 2023 16:18 )    Color: Colorless / Appearance: Clear / S.006 / pH: x  Gluc: x / Ketone: Negative  / Bili: Negative / Urobili: Negative   Blood: x / Protein: Negative / Nitrite: Negative   Leuk Esterase: Moderate / RBC: 0 /hpf / WBC 2 /HPF   Sq Epi: x / Non Sq Epi: x / Bacteria: Negative      CAPILLARY BLOOD GLUCOSE          Imaging:  CXR personally reviewed: no focal opacity    CT head: No acute intracranial hemorrhage or acute territorial infarct.  If   symptoms persist, follow-up MRI exam recommended.      EKG personally reviewed: Labs personally reviewed:                          14.8   5.97  )-----------( 226      ( 2023 13:23 )             44.4     07-    142  |  104  |  19  ----------------------------<  103<H>  4.1   |  24  |  0.67    Ca    9.7      2023 13:23    TPro  7.5  /  Alb  4.6  /  TBili  0.3  /  DBili  x   /  AST  26  /  ALT  22  /  AlkPhos  88  07-        LIVER FUNCTIONS - ( 2023 13:23 )  Alb: 4.6 g/dL / Pro: 7.5 g/dL / ALK PHOS: 88 U/L / ALT: 22 U/L / AST: 26 U/L / GGT: x             Urinalysis Basic - ( 2023 16:18 )    Color: Colorless / Appearance: Clear / S.006 / pH: x  Gluc: x / Ketone: Negative  / Bili: Negative / Urobili: Negative   Blood: x / Protein: Negative / Nitrite: Negative   Leuk Esterase: Moderate / RBC: 0 /hpf / WBC 2 /HPF   Sq Epi: x / Non Sq Epi: x / Bacteria: Negative      CAPILLARY BLOOD GLUCOSE          Imaging:  CXR personally reviewed: no focal opacity    CT head: No acute intracranial hemorrhage or acute territorial infarct.  If   symptoms persist, follow-up MRI exam recommended.      EKG personally reviewed: nsr no acute s-t elevations or depression

## 2023-07-05 NOTE — H&P ADULT - HISTORY OF PRESENT ILLNESS
Patient is a 77 year old female w/pmhx of HTN, anxiety, dementia, presents for lightheadedness for one day.    Patient reports symptoms started suddenly during breakfast  while seated on day of admission , she reports feeling lightheaded associated with generalized weakness, as well as diaphoretic. Patient had a similar episode a few months prior to admission. She reports no chest pain or palpitations , no recent illness , no fever or chills, no shortness of breath .    Patient is a 77 year old female w/pmhx of HTN, anxiety, AZ dementia ( A&Ox3) , [place] [facility] [place] for lightheadedness for one day.    Patient reports symptoms started suddenly during breakfast  while seated on day of admission , she reports feeling lightheaded associated with generalized weakness, as well as diaphoretic. Per , patient was shaking her right hand at the time.  reports, patient had an episode where she lost consciousness while standing up  about one week ago, was caught by  at the time and sustained no trauma. Paitent did not seek medical attention at the time. Patient also had a similar episode a few months prior to admission. Patient reports no chest pain or palpitations , no focal motor weakness , no loss of sensation , no episode of confusion , no difficulty with speech,  no fever or chills, no shortness of breath . Of note, patient was recently treated with erythromycin for strep throat.

## 2023-07-05 NOTE — H&P ADULT - PROBLEM SELECTOR PLAN 1
recurrent episodes including syncopal event about one week ago ,  unclear if cardiac etiology , no acute findings on CTH , no major metabolic derangements , H/H wnl , no acute ischemic changes on ekg,   - Telemetry   - Echocardiogram   - Orthostatic vital signs ( although , post 1L IV fluid bolus and therefore , results of limited utility)  - If work up non-revealing , may benefit from Holter monitoring on discharge  - IF cardiac work up negative , can consider further Neuro-evaluation ,which can be completed as outpatient  - trend H/H

## 2023-07-05 NOTE — ED ADULT NURSE NOTE - OBJECTIVE STATEMENT
here for dizziness  pt admits to cognitive problems but is alert and oriented here.    piv started and fluid given

## 2023-07-05 NOTE — H&P ADULT - NSHPREVIEWOFSYSTEMS_GEN_ALL_CORE
CONSTITUTIONAL: +  weakness, no fevers or chills  EYES/ENT: No visual changes;  No dysphagia  NECK: No pain or stiffness  RESPIRATORY: No cough, wheezing, hemoptysis; No shortness of breath  CARDIOVASCULAR: No chest pain or palpitations; No lower extremity edema  EXTREMITIES: no le edema, cyanosis, clubbing  GASTROINTESTINAL: No abdominal or epigastric pain. No nausea, vomiting, or hematemesis; No diarrhea or constipation. No melena or hematochezia.  BACK: No back pain  GENITOURINARY: No dysuria, frequency or hematuria  NEUROLOGICAL: No numbness or weakness  MSK: no joint swelling or pain  SKIN: No itching, burning, rashes, or lesions   PSYCH: no agitation  All other review of systems is negative unless indicated above.

## 2023-07-05 NOTE — ED PROVIDER NOTE - ATTENDING CONTRIBUTION TO CARE
78 yo F PMHx of HTN, anxiety, Alzheimer's, A&Ox3), gastric maltoma s/p RTx, presenting to ED for sudden onset of lightheadedness while seated today. Denies CP or SOB. No palpitations.  at bedside, reports similar episode 2 wks ago w/ full LOC. Pt denies HA. Recently treated with azithro for sore throat. Sore throat improved. No N/V. No melena/hematochezia. No dysuria/hematuria or fevers. Exam neuro intact. Abd soft and non ttp. Pt generally well appearing. CTAB. Only home med donepezil. Will evaluate for pre-syncope. No seizure like activity. Consider electrolyte abnormality, hypovolemia/dehydration, ISABELLE, occult infection, medication side effect, anemia, arrhythmia, vasovagal etiology, orthostatic hypotension, autonomic dysfunction. No CP to suggest cardiac ischemia, but will obtain trop. ECG reassuring. QTc wnl. Screening CT head, but no fall/head strike. Labs, CT, cxr, will reassess for likely admission for pre-syncopal work up.

## 2023-07-05 NOTE — H&P ADULT - NSICDXPASTMEDICALHX_GEN_ALL_CORE_FT
PAST MEDICAL HISTORY:  Anxiety     Ulcer abdominal     PAST MEDICAL HISTORY:  Anxiety     Gastric lymphoma     Ulcer abdominal

## 2023-07-05 NOTE — ED ADULT NURSE NOTE - NSFALLUNIVINTERV_ED_ALL_ED
Bed/Stretcher in lowest position, wheels locked, appropriate side rails in place/Call bell, personal items and telephone in reach/Instruct patient to call for assistance before getting out of bed/chair/stretcher/Non-slip footwear applied when patient is off stretcher/[place] to call system/Physically safe environment - no spills, clutter or unnecessary equipment/Purposeful proactive rounding/Room/bathroom lighting operational, light cord in reach

## 2023-07-05 NOTE — H&P ADULT - ASSESSMENT
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 Normal

## 2023-07-06 LAB
ALBUMIN SERPL ELPH-MCNC: 4 G/DL — SIGNIFICANT CHANGE UP (ref 3.3–5)
ALP SERPL-CCNC: 82 U/L — SIGNIFICANT CHANGE UP (ref 40–120)
ALT FLD-CCNC: 20 U/L — SIGNIFICANT CHANGE UP (ref 10–45)
ANION GAP SERPL CALC-SCNC: 12 MMOL/L — SIGNIFICANT CHANGE UP (ref 5–17)
AST SERPL-CCNC: 24 U/L — SIGNIFICANT CHANGE UP (ref 10–40)
BILIRUB SERPL-MCNC: 0.3 MG/DL — SIGNIFICANT CHANGE UP (ref 0.2–1.2)
BUN SERPL-MCNC: 14 MG/DL — SIGNIFICANT CHANGE UP (ref 7–23)
CALCIUM SERPL-MCNC: 9.1 MG/DL — SIGNIFICANT CHANGE UP (ref 8.4–10.5)
CHLORIDE SERPL-SCNC: 107 MMOL/L — SIGNIFICANT CHANGE UP (ref 96–108)
CO2 SERPL-SCNC: 22 MMOL/L — SIGNIFICANT CHANGE UP (ref 22–31)
CREAT SERPL-MCNC: 0.59 MG/DL — SIGNIFICANT CHANGE UP (ref 0.5–1.3)
CULTURE RESULTS: SIGNIFICANT CHANGE UP
EGFR: 94 ML/MIN/1.73M2 — SIGNIFICANT CHANGE UP
GLUCOSE SERPL-MCNC: 93 MG/DL — SIGNIFICANT CHANGE UP (ref 70–99)
HCT VFR BLD CALC: 40.9 % — SIGNIFICANT CHANGE UP (ref 34.5–45)
HCV AB S/CO SERPL IA: 0.1 S/CO — SIGNIFICANT CHANGE UP (ref 0–0.99)
HCV AB SERPL-IMP: SIGNIFICANT CHANGE UP
HGB BLD-MCNC: 13.8 G/DL — SIGNIFICANT CHANGE UP (ref 11.5–15.5)
MCHC RBC-ENTMCNC: 31.8 PG — SIGNIFICANT CHANGE UP (ref 27–34)
MCHC RBC-ENTMCNC: 33.7 GM/DL — SIGNIFICANT CHANGE UP (ref 32–36)
MCV RBC AUTO: 94.2 FL — SIGNIFICANT CHANGE UP (ref 80–100)
NRBC # BLD: 0 /100 WBCS — SIGNIFICANT CHANGE UP (ref 0–0)
PLATELET # BLD AUTO: 208 K/UL — SIGNIFICANT CHANGE UP (ref 150–400)
POTASSIUM SERPL-MCNC: 3.5 MMOL/L — SIGNIFICANT CHANGE UP (ref 3.5–5.3)
POTASSIUM SERPL-SCNC: 3.5 MMOL/L — SIGNIFICANT CHANGE UP (ref 3.5–5.3)
PROT SERPL-MCNC: 6.6 G/DL — SIGNIFICANT CHANGE UP (ref 6–8.3)
RBC # BLD: 4.34 M/UL — SIGNIFICANT CHANGE UP (ref 3.8–5.2)
RBC # FLD: 12.6 % — SIGNIFICANT CHANGE UP (ref 10.3–14.5)
SODIUM SERPL-SCNC: 141 MMOL/L — SIGNIFICANT CHANGE UP (ref 135–145)
SPECIMEN SOURCE: SIGNIFICANT CHANGE UP
WBC # BLD: 6.55 K/UL — SIGNIFICANT CHANGE UP (ref 3.8–10.5)
WBC # FLD AUTO: 6.55 K/UL — SIGNIFICANT CHANGE UP (ref 3.8–10.5)

## 2023-07-06 PROCEDURE — 93306 TTE W/DOPPLER COMPLETE: CPT | Mod: 26

## 2023-07-06 RX ORDER — POTASSIUM CHLORIDE 20 MEQ
40 PACKET (EA) ORAL ONCE
Refills: 0 | Status: COMPLETED | OUTPATIENT
Start: 2023-07-06 | End: 2023-07-06

## 2023-07-06 RX ORDER — BENZOCAINE AND MENTHOL 5; 1 G/100ML; G/100ML
1 LIQUID ORAL EVERY 4 HOURS
Refills: 0 | Status: DISCONTINUED | OUTPATIENT
Start: 2023-07-06 | End: 2023-07-09

## 2023-07-06 RX ADMIN — Medication 40 MILLIEQUIVALENT(S): at 09:38

## 2023-07-06 RX ADMIN — ENOXAPARIN SODIUM 40 MILLIGRAM(S): 100 INJECTION SUBCUTANEOUS at 09:39

## 2023-07-06 RX ADMIN — BENZOCAINE AND MENTHOL 1 LOZENGE: 5; 1 LIQUID ORAL at 18:02

## 2023-07-06 RX ADMIN — Medication 30 MILLILITER(S): at 18:02

## 2023-07-06 RX ADMIN — DONEPEZIL HYDROCHLORIDE 10 MILLIGRAM(S): 10 TABLET, FILM COATED ORAL at 21:27

## 2023-07-06 NOTE — CONSULT NOTE ADULT - SUBJECTIVE AND OBJECTIVE BOX
CARDIOLOGY CONSULT - Dr. Worrell         HPI:  Patient is a 77 year old female w/pmhx of HTN, anxiety, AZ dementia ( A&Ox3) , h/o gastric maltoma s/p RTx ( at [facility] [place]  presents for lightheadedness for one day.    Patient reports symptoms started suddenly during breakfast  while seated on day of admission , she reports feeling lightheaded associated with generalized weakness, as well as diaphoretic. Per , patient was shaking her right hand at the time.  reports, patient had an episode where she lost consciousness while standing up  about one week ago, was caught by  at the time and sustained no trauma. Paitent did not seek medical attention at the time. Patient also had a similar episode a few months prior to admission. Patient reports no chest pain or palpitations , no focal motor weakness , no loss of sensation , no episode of confusion , no difficulty with speech,  no fever or chills, no shortness of breath . Of note, patient was recently treated with erythromycin for strep throat.         PAST MEDICAL & SURGICAL HISTORY:  Ulcer  abdominal      Anxiety      Gastric lymphoma      No significant past surgical history              PREVIOUS DIAGNOSTIC TESTING:    [ ] Echocardiogram:  [ ]  Catheterization:  [ ] Stress Test:  	    MEDICATIONS:  Home Medications:  donepezil 10 mg oral tablet: 1 orally once a day (05 Jul 2023 20:39)      MEDICATIONS  (STANDING):  donepezil 10 milliGRAM(s) Oral at bedtime  enoxaparin Injectable 40 milliGRAM(s) SubCutaneous every 24 hours      FAMILY HISTORY:  No pertinent family history in first degree relatives        SOCIAL HISTORY:    [ ] Non-smoker  [ ] Smoker  [ ] Alcohol    Allergies    Sulfacetamide Sodium (Unknown)    Intolerances    	    REVIEW OF SYSTEMS:  CONSTITUTIONAL: No fever, weight loss, or fatigue  EYES: No eye pain, visual disturbances, or discharge  ENMT:  No difficulty hearing, tinnitus, vertigo; No sinus or throat pain  NECK: No pain or stiffness  RESPIRATORY: No cough, wheezing, chills or hemoptysis; No Shortness of Breath  CARDIOVASCULAR: No chest pain, palpitations, passing out, dizziness, or leg swelling  GASTROINTESTINAL: No abdominal or epigastric pain. No nausea, vomiting, or hematemesis; No diarrhea or constipation. No melena or hematochezia.  GENITOURINARY: No dysuria, frequency, hematuria, or incontinence  NEUROLOGICAL: No headaches, memory loss, loss of strength, numbness, or tremors  SKIN: No itching, burning, rashes, or lesions   	    [ ] All others negative	  [ ] Unable to obtain    PHYSICAL EXAM:  T(C): 36.6 (07-06-23 @ 05:31), Max: 37 (07-05-23 @ 23:15)  HR: 62 (07-06-23 @ 05:31) (62 - 70)  BP: 137/78 (07-06-23 @ 05:31) (111/52 - 160/70)  RR: 18 (07-06-23 @ 05:31) (18 - 20)  SpO2: 98% (07-06-23 @ 05:31) (96% - 99%)  Wt(kg): --  I&O's Summary      Appearance: Normal	  Psychiatry: A & O x 3, Mood & affect appropriate  HEENT:   Normal oral mucosa, PERRL, EOMI	  Lymphatic: No lymphadenopathy  Cardiovascular: Normal S1 S2,RRR, No JVD, No murmurs  Respiratory: Lungs clear to auscultation	  Gastrointestinal:  Soft, Non-tender, + BS	  Skin: No rashes, No ecchymoses, No cyanosis	  Neurologic: Non-focal  Extremities: Normal range of motion, No clubbing, cyanosis or edema  Vascular: Peripheral pulses palpable 2+ bilaterally    TELEMETRY: 	    ECG:  	  RADIOLOGY:  : CT Head No Cont (07.05.23 @ 16:31) >    IMPRESSION:  No acute intracranial hemorrhage oracute territorial infarct.  If   symptoms persist, follow-up MRI exam recommended.    --- End of Report ---      < end of copied text >    OTHER: 	  	  LABS:	 	    CARDIAC MARKERS:  Troponin T, High Sensitivity Result: 12 ng/L (07-05 @ 19:06)  Troponin T, High Sensitivity Result: 13 ng/L (07-05 @ 13:23)                                  13.8   6.55  )-----------( 208      ( 06 Jul 2023 07:04 )             40.9     07-06    141  |  107  |  14  ----------------------------<  93  3.5   |  22  |  0.59    Ca    9.1      06 Jul 2023 07:04    TPro  6.6  /  Alb  4.0  /  TBili  0.3  /  DBili  x   /  AST  24  /  ALT  20  /  AlkPhos  82  07-06      proBNP:   Lipid Profile:   HgA1c:   TSH:        CARDIOLOGY CONSULT - Dr. Worrell         HPI:  Patient is a 77 year old female w/pmhx of HTN, anxiety, AZ dementia ( A&Ox3) , h/o gastric maltoma s/p RTx ( at [facility] [place]  presents for lightheadedness for one day.    Patient reports symptoms started suddenly during breakfast  while seated on day of admission , she reports feeling lightheaded associated with generalized weakness, as well as diaphoretic. Per , patient was shaking her right hand at the time.  reports, patient had an episode where she lost consciousness while standing up  about one week ago, was caught by  at the time and sustained no trauma. Paitent did not seek medical attention at the time. Patient also had a similar episode a few months prior to admission. Patient reports no chest pain or palpitations , no focal motor weakness , no loss of sensation , no episode of confusion , no difficulty with speech,  no fever or chills, no shortness of breath . Of note, patient was recently treated with erythromycin for strep throat.   no recent cardiac eval that she can recall   reported again feeling lightheadness and weak when OOB to bathroom -- no cp or sob   ROS Otherwise negative         PAST MEDICAL & SURGICAL HISTORY:  Ulcer  abdominal      Anxiety      Gastric lymphoma      No significant past surgical history              PREVIOUS DIAGNOSTIC TESTING:    [ ] Echocardiogram:  [ ]  Catheterization:  [ ] Stress Test:  	    MEDICATIONS:  Home Medications:  donepezil 10 mg oral tablet: 1 orally once a day (05 Jul 2023 20:39)      MEDICATIONS  (STANDING):  donepezil 10 milliGRAM(s) Oral at bedtime  enoxaparin Injectable 40 milliGRAM(s) SubCutaneous every 24 hours      FAMILY HISTORY:  No pertinent family history in first degree relatives        SOCIAL HISTORY:    [ x] Non-smoker  [ ] Smoker  [ ] Alcohol    Allergies    Sulfacetamide Sodium (Unknown)    Intolerances    	    REVIEW OF SYSTEMS:  CONSTITUTIONAL: No fever, weight loss, or fatigue  EYES: No eye pain, visual disturbances, or discharge  ENMT:  No difficulty hearing, tinnitus, vertigo; No sinus or throat pain  NECK: No pain or stiffness  RESPIRATORY: No cough, wheezing, chills or hemoptysis; No Shortness of Breath  CARDIOVASCULAR: see hpi   GASTROINTESTINAL: No abdominal or epigastric pain. No nausea, vomiting, or hematemesis; No diarrhea or constipation. No melena or hematochezia.  GENITOURINARY: No dysuria, frequency, hematuria, or incontinence  NEUROLOGICAL: No headaches, memory loss, loss of strength, numbness, or tremors  SKIN: No itching, burning, rashes, or lesions   	    [x ] All others negative	  [ ] Unable to obtain    PHYSICAL EXAM:  T(C): 36.6 (07-06-23 @ 05:31), Max: 37 (07-05-23 @ 23:15)  HR: 62 (07-06-23 @ 05:31) (62 - 70)  BP: 137/78 (07-06-23 @ 05:31) (111/52 - 160/70)  RR: 18 (07-06-23 @ 05:31) (18 - 20)  SpO2: 98% (07-06-23 @ 05:31) (96% - 99%)  Wt(kg): --  I&O's Summary      Appearance: Normal	  Psychiatry: A & O x 3, Mood & affect appropriate  HEENT:   Normal oral mucosa, PERRL, EOMI	  Lymphatic: No lymphadenopathy  Cardiovascular: Normal S1 S2,RRR  Respiratory: Lungs clear to auscultation	  Gastrointestinal:  Soft, Non-tender, + BS	  Skin: No rashes, No ecchymoses, No cyanosis	  Neurologic: Non-focal  Extremities: Normal range of motion, No clubbing, cyanosis or edema  Vascular: Peripheral pulses palpable 2+ bilaterally    TELEMETRY: 	nsr     ECG:  	NONE in chart   RADIOLOGY:  : CT Head No Cont (07.05.23 @ 16:31) >    IMPRESSION:  No acute intracranial hemorrhage or acute territorial infarct.  If   symptoms persist, follow-up MRI exam recommended.    --- End of Report ---      < end of copied text >    OTHER: 	  	  LABS:	 	    CARDIAC MARKERS:  Troponin T, High Sensitivity Result: 12 ng/L (07-05 @ 19:06)  Troponin T, High Sensitivity Result: 13 ng/L (07-05 @ 13:23)                                  13.8   6.55  )-----------( 208      ( 06 Jul 2023 07:04 )             40.9     07-06    141  |  107  |  14  ----------------------------<  93  3.5   |  22  |  0.59    Ca    9.1      06 Jul 2023 07:04    TPro  6.6  /  Alb  4.0  /  TBili  0.3  /  DBili  x   /  AST  24  /  ALT  20  /  AlkPhos  82  07-06      proBNP:   Lipid Profile:   HgA1c:   TSH:

## 2023-07-06 NOTE — PATIENT PROFILE ADULT - FALL HARM RISK - HARM RISK INTERVENTIONS
Assistance with ambulation/Assistance OOB with selected safe patient handling equipment/Communicate Risk of Fall with Harm to all staff/Monitor gait and stability/Reinforce activity limits and safety measures with patient and family/Sit up slowly, dangle for a short time, stand at bedside before walking/Tailored Fall Risk Interventions/Visual Cue: Yellow wristband and red socks/Bed in lowest position, wheels locked, appropriate side rails in place/Call bell, personal items and telephone in reach/Instruct patient to call for assistance before getting out of bed or chair/Non-slip footwear when patient is out of bed/[place] to call system/Physically safe environment - no spills, clutter or unnecessary equipment/Purposeful Proactive Rounding/Room/bathroom lighting operational, light cord in reach

## 2023-07-06 NOTE — CONSULT NOTE ADULT - TIME BILLING
Agree with above NP note.  77 year old female w/pmhx of HTN, anxiety, AZ dementia ( A&Ox3) , h/o gastric maltoma s/p RTx ( at [facility])  presents for lightheadedness    #lightheadedness  -check orthostatics  -ct head unremarkable   -f/u echo to eval LV/rule out for valvular disease   -further imaging/ neuro work up  per med  -dvt ppx

## 2023-07-06 NOTE — CONSULT NOTE ADULT - ASSESSMENT
77 year old female w/pmhx of HTN, anxiety, AZ dementia ( A&Ox3) , h/o gastric maltoma s/p RTx ( at [facility])  presents for lightheadedness 77 year old female w/pmhx of HTN, anxiety, AZ dementia ( A&Ox3) , h/o gastric maltoma s/p RTx ( at [facility])  presents for lightheadedness    #lightheadedness  -check ekg   -reports lightheadedness when oob  -check orthostatics  -ct head unremarkable   -check echo to eval LV/rule out for valvular disease   -tele to r/o occult arrhythmias  -reports also bl LE weakness- further imaging/ neuro work up  per med    dvt ppx

## 2023-07-07 ENCOUNTER — TRANSCRIPTION ENCOUNTER (OUTPATIENT)
Age: 78
End: 2023-07-07

## 2023-07-07 LAB
ANION GAP SERPL CALC-SCNC: 11 MMOL/L — SIGNIFICANT CHANGE UP (ref 5–17)
BUN SERPL-MCNC: 12 MG/DL — SIGNIFICANT CHANGE UP (ref 7–23)
CALCIUM SERPL-MCNC: 8.9 MG/DL — SIGNIFICANT CHANGE UP (ref 8.4–10.5)
CHLORIDE SERPL-SCNC: 107 MMOL/L — SIGNIFICANT CHANGE UP (ref 96–108)
CO2 SERPL-SCNC: 22 MMOL/L — SIGNIFICANT CHANGE UP (ref 22–31)
CREAT SERPL-MCNC: 0.62 MG/DL — SIGNIFICANT CHANGE UP (ref 0.5–1.3)
EGFR: 93 ML/MIN/1.73M2 — SIGNIFICANT CHANGE UP
GLUCOSE SERPL-MCNC: 90 MG/DL — SIGNIFICANT CHANGE UP (ref 70–99)
HCT VFR BLD CALC: 41.3 % — SIGNIFICANT CHANGE UP (ref 34.5–45)
HGB BLD-MCNC: 13.8 G/DL — SIGNIFICANT CHANGE UP (ref 11.5–15.5)
MCHC RBC-ENTMCNC: 31.2 PG — SIGNIFICANT CHANGE UP (ref 27–34)
MCHC RBC-ENTMCNC: 33.4 GM/DL — SIGNIFICANT CHANGE UP (ref 32–36)
MCV RBC AUTO: 93.2 FL — SIGNIFICANT CHANGE UP (ref 80–100)
NRBC # BLD: 0 /100 WBCS — SIGNIFICANT CHANGE UP (ref 0–0)
PLATELET # BLD AUTO: 213 K/UL — SIGNIFICANT CHANGE UP (ref 150–400)
POTASSIUM SERPL-MCNC: 3.8 MMOL/L — SIGNIFICANT CHANGE UP (ref 3.5–5.3)
POTASSIUM SERPL-SCNC: 3.8 MMOL/L — SIGNIFICANT CHANGE UP (ref 3.5–5.3)
RBC # BLD: 4.43 M/UL — SIGNIFICANT CHANGE UP (ref 3.8–5.2)
RBC # FLD: 12.6 % — SIGNIFICANT CHANGE UP (ref 10.3–14.5)
SODIUM SERPL-SCNC: 140 MMOL/L — SIGNIFICANT CHANGE UP (ref 135–145)
WBC # BLD: 6.53 K/UL — SIGNIFICANT CHANGE UP (ref 3.8–10.5)
WBC # FLD AUTO: 6.53 K/UL — SIGNIFICANT CHANGE UP (ref 3.8–10.5)

## 2023-07-07 PROCEDURE — 93010 ELECTROCARDIOGRAM REPORT: CPT

## 2023-07-07 PROCEDURE — 99222 1ST HOSP IP/OBS MODERATE 55: CPT

## 2023-07-07 RX ORDER — DONEPEZIL HYDROCHLORIDE 10 MG/1
10 TABLET, FILM COATED ORAL ONCE
Refills: 0 | Status: DISCONTINUED | OUTPATIENT
Start: 2023-07-07 | End: 2023-07-07

## 2023-07-07 RX ORDER — DONEPEZIL HYDROCHLORIDE 10 MG/1
10 TABLET, FILM COATED ORAL
Refills: 0 | Status: DISCONTINUED | OUTPATIENT
Start: 2023-07-08 | End: 2023-07-09

## 2023-07-07 RX ADMIN — ENOXAPARIN SODIUM 40 MILLIGRAM(S): 100 INJECTION SUBCUTANEOUS at 09:49

## 2023-07-07 RX ADMIN — ONDANSETRON 4 MILLIGRAM(S): 8 TABLET, FILM COATED ORAL at 13:54

## 2023-07-07 NOTE — PHYSICAL THERAPY INITIAL EVALUATION ADULT - PERTINENT HX OF CURRENT PROBLEM, REHAB EVAL
77 y.o. F PMH HTN, anxiety, AZ dementia ( A&Ox3) , [place]p [place] [facility] [place]  presents for lightheadedness for one day. Patient reports symptoms started suddenly during breakfast  while seated on day of admission , she reports feeling lightheaded associated with generalized weakness, as well as diaphoretic. Per , patient was shaking her right hand at the time.  reports, patient had an episode where she lost consciousness while standing up  about one week ago, was caught by  at the time and sustained no trauma. Patient did not seek medical attention at the time. Patient also had a similar episode a few months prior to admission. Patient reports no chest pain or palpitations , no focal motor weakness , no loss of sensation , no episode of confusion , no difficulty with speech,  no fever or chills, no shortness of breath . Of note, patient was recently treated with erythromycin for strep throat.

## 2023-07-07 NOTE — DISCHARGE NOTE PROVIDER - NSDCCPCAREPLAN_GEN_ALL_CORE_FT
PRINCIPAL DISCHARGE DIAGNOSIS  Diagnosis: Near syncope  Assessment and Plan of Treatment: HOME CARE INSTRUCTIONS  Have someone stay with you until you feel stable.  Do not drive, operate machinery, or play sports until your caregiver says it is okay.  Keep all follow-up appointments as directed by your caregiver.   Lie down right away if you start feeling like you might faint. Breathe deeply and steadily. Wait until all the symptoms have passed.Drink enough fluids to keep your urine clear or pale yellow.  If you are taking blood pressure or heart medicine, get up slowly, taking several minutes to sit and then stand. This can reduce dizziness.  SEEK IMMEDIATE MEDICAL CARE IF:  You have a severe headache.  You have unusual pain in the chest, abdomen, or back.  You are bleeding from the mouth or rectum, or you have black or tarry stool.  You have an irregular or very fast heartbeat.  You have pain with breathing.  You have repeated fainting or seizure-like jerking during an episode.  You faint when sitting or lying down.  You have confusion.  You have difficulty walking.  You have severe weakness.  You have vision problems.        SECONDARY DISCHARGE DIAGNOSES  Diagnosis: Gastric lymphoma  Assessment and Plan of Treatment: Follow up with PMD and Oncology afterwards    Diagnosis: Alzheimer's dementia  Assessment and Plan of Treatment: Maintain Fall precautions. Measures to reduce delirium: frequent reorientation, maintain sleep/wake routine, avoid sleeping during the day, keep lights on during the day, use of hearing aids or glasses if patient needs them, regular toileting.      Diagnosis: History of syncope  Assessment and Plan of Treatment: Echo normal , orthostatic negative  Maintain proper hydration as very hot outside  Follow up with PMD for mangment and outpatient [facility]     PRINCIPAL DISCHARGE DIAGNOSIS  Diagnosis: Near syncope  Assessment and Plan of Treatment: HOME CARE INSTRUCTIONS  Have someone stay with you until you feel stable.  Do not drive, operate machinery, or play sports until your caregiver says it is okay.  Keep all follow-up appointments as directed by your caregiver.   Lie down right away if you start feeling like you might faint. Breathe deeply and steadily. Wait until all the symptoms have passed.Drink enough fluids to keep your urine clear or pale yellow.  If you are taking blood pressure or heart medicine, get up slowly, taking several minutes to sit and then stand. This can reduce dizziness.  SEEK IMMEDIATE MEDICAL CARE IF:  You have a severe headache.  You have unusual pain in the chest, abdomen, or back.  You are bleeding from the mouth or rectum, or you have black or tarry stool.  You have an irregular or very fast heartbeat.  You have pain with breathing.  You have repeated fainting or seizure-like jerking during an episode.  You faint when sitting or lying down.  You have confusion.  You have difficulty walking.  You have severe weakness.  You have vision problems.  Follow up with PMD and cards outpatient         SECONDARY DISCHARGE DIAGNOSES  Diagnosis: Gastric lymphoma  Assessment and Plan of Treatment: Follow up with PMD and Oncology as needed    Diagnosis: Alzheimer's dementia  Assessment and Plan of Treatment: Maintain Fall precautions. Measures to reduce delirium: frequent reorientation, maintain sleep/wake routine, avoid sleeping during the day, keep lights on during the day, use of hearing aids or glasses if patient needs them, regular toileting.  Follow up with Neurology Dr. Benjamin for medical managment       Diagnosis: History of syncope  Assessment and Plan of Treatment: Echo normal , orthostatic negative  Maintain proper hydration as very hot outside  Follow up with PMD for mangment and outpatient [facility]

## 2023-07-07 NOTE — DISCHARGE NOTE PROVIDER - HOSPITAL COURSE
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     Plan:    # Syncope:  - CTH with no acute findings  - CXR with clear lungs  - Trops wnl  - Monitor on tele  - Fall precautions  - Orthostatics negative   - Neuro checks  - Echo normal EF 70  -  Holter monitoring outpatient     # HTN:  - Trend BP''s    # Anxiety:  - Stable. Continue to monitor    # Alzheimer dementia:  - C/w current meds    # Gastric lymphoma:  - Outpt Follow up    # GI ppx:  - Bowel regimen     # DVT ppx:  - Lovenox sq    Discharged    77F w/pmhx of HTN, anxiety, AZ dementia ( A&Ox3) , h/o gastric maltoma s/p RTx ( at [facility])  presents for lightheadedness for one day.     Plan:    # Syncope:  - CTH with no acute findings  - CXR with clear lungs  - Trops wnl  - Monitor on tele  - Fall precautions  - Orthostatics negative   - Neuro checks  - Echo normal EF 70  -  Holter monitoring outpatient     # HTN:  - Trend BP''s    # Anxiety:  - Stable. 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     Plan:    # Syncope:  - CTH with no acute findings  - CXR with clear lungs  - Trops wnl  - Monitor on tele  - Fall precautions  - Orthostatics negative   - Neuro checks  - Echo normal EF 70  -  Holter monitoring outpatient     # HTN:  - Trend BP''s    # Anxiety:  - Stable. Continue to monitor    # Alzheimer dementia:  - C/w current meds    # Gastric lymphoma:  - Outpt Follow up    # GI ppx:  - Bowel regimen     Dr Dickinson cleared patient  for discharged home

## 2023-07-07 NOTE — DISCHARGE NOTE PROVIDER - PROVIDER TOKENS
PROVIDER:[TOKEN:[[id number]]] PROVIDER:[TOKEN:[[id number]]],FREE:[LAST:[mariella],FIRST:[ismael],PHONE:[(   )    -],FAX:[(   )    -],ADDRESS:[[phone]]] PROVIDER:[TOKEN:[[id number]]],FREE:[LAST:[mariella],FIRST:[ismael],PHONE:[(   )    -],FAX:[(   )    -],ADDRESS:[[phone]]],PROVIDER:[TOKEN:[[id number]],FOLLOWUP:[2 weeks]]

## 2023-07-07 NOTE — CONSULT NOTE ADULT - SUBJECTIVE AND OBJECTIVE BOX
Neurology Consultation     HPI:  77F with PMH Alzheimer dementia (AAOx3 at baseline), HTN, anxiety, h/o gastric maltoma s/p RT at [facility] initially presenting for weakness x1 day with 1 syncopal episode 1 week prior. 1 week ago, after using the bathroom, pt felt sudden onset of weakness, mostly LE, and fell to the bathroom floor- was able to call for  who was nearby to help her get up. Per  at bedside, pt appeared to have LOC for a few seconds, did not hit head, came to spontaneously without any confusion. Pt does not recall any prodromal sxs- denied CP, palpitations, SOB, dizziness, vision changes, diaphoresis, headache. Never happened before. Then 1 day prior to admission, felt overall weakness, with difficulty getting up from chair, so came to the hospital- did not LOC or fall. At baseline ambulatory without any assistive devices     Of note, pt only taking donepezil 10mg, dose was  increased from 5mg 3 months prior, pt and  wondering if related. Pt reports prior drug sensitivities causing lightheadedness including namenda. Does not take any other meds, only vitamins (vit D, calcium). No new meds.     Syncope workup inpatient thus far unrevealing. CT head on admission (7/5) negative for any acute intracranial pathology. Cardiac workup unrevealing with negative troponins and TTE wnl (EF 70%, no significant valvular pathology). EKG on admission with sinus kimberly 57-58, telemetry with sinus 50s-80s, no acute events. Blood work without electrolyte derangements. Negative orthostatics. Pending PT consult.   While here, was able to walk back and forth to bathroom yesterday, but today reported worsening weakness. States weakness mostly when getting up from sitting position. Have not noticed any UE weakness. Denies CP, palpitations, headache, SOB, weight loss, decreased PO intake, fever, any LE/back pain.     REVIEW OF SYSTEMS:  CONSTITUTIONAL: No fevers or chills  EYES/ENT: No visual changes;  No vertigo or throat pain   NECK: No pain or stiffness  RESPIRATORY: No cough, wheezing, hemoptysis; No shortness of breath  CARDIOVASCULAR: No chest pain or palpitations  GASTROINTESTINAL: No abdominal or epigastric pain. No nausea, vomiting, or hematemesis; No diarrhea or constipation. No melena or hematochezia.  GENITOURINARY: No dysuria, frequency or hematuria  NEUROLOGICAL: No headache or dizzines   SKIN: No itching, rashes      NIHSS: 0    PAST MEDICAL & SURGICAL HISTORY:  Ulcer, abdominal  Anxiety  Gastric lymphoma    No significant past surgical history    FAMILY HISTORY:  No pertinent family history in first degree relatives      MEDICATIONS   MEDICATIONS  (STANDING):  donepezil 10 milliGRAM(s) Oral at bedtime  enoxaparin Injectable 40 milliGRAM(s) SubCutaneous every 24 hours    MEDICATIONS  (PRN):  acetaminophen     Tablet .. 650 milliGRAM(s) Oral every 6 hours PRN Temp greater or equal to 38C (100.4F), Mild Pain (1 - 3)  aluminum hydroxide/magnesium hydroxide/simethicone Suspension 30 milliLiter(s) Oral every 4 hours PRN Dyspepsia  benzocaine/menthol Lozenge 1 Lozenge Oral every 4 hours PRN Sore Throat  melatonin 3 milliGRAM(s) Oral at bedtime PRN Insomnia  ondansetron Injectable 4 milliGRAM(s) IV Push every 8 hours PRN Nausea and/or Vomiting    ALLERGIES/INTOLERANCES:  Allergies  Sulfacetamide Sodium (Unknown)    Intolerances    VITALS & EXAMINATION:  Vital Signs Last 24 Hrs  T(C): 37.3 (07 Jul 2023 12:27), Max: 37.3 (07 Jul 2023 12:27)  T(F): 99.1 (07 Jul 2023 12:27), Max: 99.1 (07 Jul 2023 12:27)  HR: 66 (07 Jul 2023 12:27) (60 - 113)  BP: 122/74 (07 Jul 2023 12:27) (119/74 - 161/83)  BP(mean): --  RR: 18 (07 Jul 2023 12:27) (18 - 18)  SpO2: 96% (07 Jul 2023 12:27) (95% - 96%)    Parameters below as of 07 Jul 2023 12:27  Patient On (Oxygen Delivery Method): room air      General:  Constitutional: Female, appears stated age, nontoxic, not in distress   Head: Normocephalic  Eyes: clear sclera;   Extremities: No cyanosis;   Resp: breathing comfortably   CV: regular rate and rhythm, nl S1, S2    Neurological (>12):  MS: Awake, alert.  Oriented person place situation. Follows commands. Attends to examiner  Language: Speech is hypophonic, clear, fluent, good repetition,  comprehension, registration of words.  CNs: PERRL (R 3mm, L 3mm). VFF. EOMI. No disconjugate gaze, skew. V1-3 intact LT, No facial asymmetry b/l. Hearing grossly normal b/l. Tongue midline.     Motor - Normal bulk and tone throughout. No pronator drift.    L/R (out of 5)       Deltoid  5/5    Biceps   5/5      Triceps  5/5         Wrist Extension 5/5   Wrist Flexion  5/5      5/5   L/R (out of 5)       Hip Flexion  5/5    Hip Extension  5/5  Knee Extension  5/5  Dorsiflexion  5/5      Plantar Flexion 5/5     Sensation: Intact to LT b/l. Cortical: Extinction on DSS (neglect): none  Coordination: No dysmetria to FTN b/l UE    LABORATORY:  CBC                       13.8   6.53  )-----------( 213      ( 07 Jul 2023 06:55 )             41.3     Chem 07-07    140  |  107  |  12  ----------------------------<  90  3.8   |  22  |  0.62    Ca    8.9      07 Jul 2023 06:58    TPro  6.6  /  Alb  4.0  /  TBili  0.3  /  DBili  x   /  AST  24  /  ALT  20  /  AlkPhos  82  07-06    LFTs LIVER FUNCTIONS - ( 06 Jul 2023 07:04 )  Alb: 4.0 g/dL / Pro: 6.6 g/dL / ALK PHOS: 82 U/L / ALT: 20 U/L / AST: 24 U/L / GGT: x           Coagulopathy   Lipid Panel   A1c   Cardiac enzymes     U/A Urinalysis Basic - ( 07 Jul 2023 06:58 )    Color: x / Appearance: x / SG: x / pH: x  Gluc: 90 mg/dL / Ketone: x  / Bili: x / Urobili: x   Blood: x / Protein: x / Nitrite: x   Leuk Esterase: x / RBC: x / WBC x   Sq Epi: x / Non Sq Epi: x / Bacteria: x      CSF  Other    STUDIES & IMAGING: (EEG, CT, MR, U/S, TTE/SIMRAN): Reviewed

## 2023-07-07 NOTE — DISCHARGE NOTE PROVIDER - CARE PROVIDER_API CALL
Giovani Salmeron  Internal Medicine  [address] [place] [address], [place] [address]  Phone: [phone]  Fax: [phone]  Follow Up Time:    Giovani Salmeron  Internal Medicine  [address] [place] [address], [facility] [address]  [place] [address]  Phone: [phone]  Fax: [phone]  Follow Up Time:     ismael wolff  [phone]  Phone: (   )    -  Fax: (   )    -  Follow Up Time:    Giovani Salmeron  Internal Medicine  [address] [place] [address], [address]  [place] [address]  Phone: [phone]  Fax: [phone]  Follow Up Time:     ismael wolff  [phone]  Phone: (   )    -  Fax: (   )    -  Follow Up Time:     Eusebio Hernandez  Cardiac Electrophysiology  [address] [place] [id number]  Phone: [phone]  Fax: [phone]  Follow Up Time: 2 weeks

## 2023-07-07 NOTE — PHYSICAL THERAPY INITIAL EVALUATION ADULT - NSPTDMEREC_GEN_A_CORE
Rollator. Pt would benefit from rollator if dizziness persists so that she may safely sit if needed.

## 2023-07-07 NOTE — CONSULT NOTE ADULT - ASSESSMENT
77 year old female w/pmhx of HTN, anxiety, AZ dementia ( A&Ox3) , h/o gastric maltoma s/p [facility]x ( at [facility]) presents for lightheadedness and syncope.     - syncope is unlikely to be arrhythmic in etiology, no further EP risk stratification of procedure indicated   - rhythm monitor reasonable to correlated symptoms to rhythm although suspect will be associated with sinus tachycardia   - can consider diagnosis of POTS given significant heart rate increase upon standing as well as intermittent sinus tachycardia on telemetry, discussed increasing salt and water intake, standing slowly, can consider PT / regular exercise    77 year old female w/pmhx of HTN, anxiety, AZ dementia ( A&Ox3) , h/o gastric maltoma s/p RTx ( at [facility] [place] [place] presents for lightheadedness and syncope.     - syncope is unlikely to be arrhythmic in etiology, no further EP risk stratification of procedure indicated   - rhythm monitor reasonable to correlated symptoms to rhythm although suspect will be associated with sinus tachycardia   - can consider diagnosis of POTS given significant heart rate increase upon standing as well as intermittent sinus tachycardia on telemetry, discussed increasing salt and water intake, standing slowly, can consider PT / regular exercise   - would discuss possibility of contribution of donepezil with neurology    77 year old female w/pmhx of HTN, anxiety, AZ dementia ( A&Ox3) , h/o gastric maltoma s/p RTx ( at [facility]) presents for lightheadedness and syncope.     - syncope is unlikely to be arrhythmic in etiology, no further invasive EP risk stratification of procedure indicated   - rhythm monitor reasonable to correlated symptoms to rhythm although suspect will be associated with sinus tachycardia, can call [phone] to arrange on day of discharge   - can consider diagnosis of POTS given significant heart rate increase upon standing as well as intermittent sinus tachycardia on telemetry, discussed increasing salt and water intake, standing slowly, can consider PT / regular exercise   - would discuss possibility of contribution of donepezil with neurology

## 2023-07-07 NOTE — PROGRESS NOTE ADULT - TIME BILLING
Patient seen and examined.  Agree with above NP note.  cv stable  cont current tx  eps eval noted  dcp

## 2023-07-07 NOTE — DISCHARGE NOTE PROVIDER - NSDCFUADDAPPT_GEN_ALL_CORE_FT
Follow up with Dr Salmeron and Cassidy within 3 days of discharge.  Follow up with Dr Hernandez in 14  days for removal and results of Holter monitor

## 2023-07-07 NOTE — DISCHARGE NOTE PROVIDER - NSDCMRMEDTOKEN_GEN_ALL_CORE_FT
donepezil 10 mg oral tablet: 1 orally once a day   Aricept 5 mg oral tablet: 1 tab(s) orally once a day  Please evaluate and treat for Physical Therapy: Physical Therapy  Rollator: use as instructed

## 2023-07-07 NOTE — CONSULT NOTE ADULT - ASSESSMENT
77F with PMH Alzheimer dementia (AAOx3 at baseline), HTN, anxiety, h/o gastric maltoma s/p RT at [facility] initially presenting for weakness x1 day with 1 syncopal episode 1 week prior. Admitted to medicine for syncope workup, workup thus far unrevealing. Neurology consulted for further recommendations.    #Syncope  - Workup thus far unrevealing. Low suspicion for structural etiology, CT head on admission (7/5) negative for any acute intracranial pathology. Cardiac workup unrevealing with negative troponin and TTE wnl (EF 70%, no significant valvular pathology); EKG on admission with sinus kimberly 57-58; telemetry with sinus 50s-80s, no acute events. Blood work without electrolyte derangements. Negative orthostatics  - EP/cardiology following with low suspicion for cardiogenic etiology at this time, agree with [employer] monitoring and outpatient follow up  - on Donepezil 10mg, uptitrated from 5mg 3 months prior. Low suspicion but can consider downtitrating if symptoms persist     #Weakness  - Based on interview, appears to be more lower extremity weakness with difficulty getting up from sitting position. Worsened today. No noticeable UE weakness. Strength 5/5 throughout on exam  - pending PT evaluation   - can consider checking CPK, ESR, and CRP given subjective proximal muscle weakness. No other obvious systemic sxs   - if improves towards baseline, then no contraindication to discharge from neurological standpoint  - if persists or worsens, can consider MR t/l spine for further evaluation.     Will be seen by 7/8 by neurology attending.  77F with PMH Alzheimer dementia (AAOx3 at baseline), HTN, anxiety, h/o gastric maltoma s/p [place] [facility] [place] for weakness x1 day with 1 syncopal episode 1 week prior. Admitted to medicine for syncope workup, workup thus far unrevealing. Neurology consulted for further recommendations.    #Syncope  With syncopal episode 1 week prior without prodromal sxs with 1 day of weakness/lightheadedness- was unable to get up from chair.   - Syncope workup thus far unrevealing. Low suspicion for structural etiology, CT head on admission (7/5) negative for any acute intracranial pathology. Cardiac workup unrevealing with negative troponin and TTE wnl (EF 70%, no significant valvular pathology); EKG on admission with sinus kimberly 57-58; telemetry with sinus 50s-80s, no acute events. Blood work without electrolyte derangements. Negative orthostatics  - EP/cardiology following with low suspicion for cardiogenic etiology at this time, agree with Biotel monitoring and outpatient follow up  - on Donepezil 10mg, uptitrated from 5mg 3 months prior. Low suspicion but can consider downtitrating if symptoms persist     #Weakness  - Based on interview, appears to be more lower extremity weakness with difficulty getting up from sitting position. Worsened today. No noticeable UE weakness. Strength 5/5 throughout on exam  - pending PT evaluation   - can consider checking CPK, ESR, and CRP given subjective proximal muscle weakness. No other obvious systemic sxs   - if improves towards baseline, then no contraindication to discharge from neurological standpoint  - if persists or worsens, can consider MR t/l spine for further evaluation.     Will be seen by 7/8 by neurology attending.

## 2023-07-07 NOTE — CONSULT NOTE ADULT - SUBJECTIVE AND OBJECTIVE BOX
All Cardiology service information can be found 24/7 on [email or web address], password: [email or web address]    Patient seen and evaluated at bedside    Chief Complaint: dizziness syncope     HPI:  Patient is a 77 year old female w/pmhx of HTN, anxiety, AZ dementia ( A&Ox3) , h/o gastric maltoma s/p RTx ( at [facility])  presents for lightheadedness. Patient states that she has had dizziness chronically although it has worsened over the past few weeks limiting her ability to ambulate and stand. She reports one syncopal episode last week after using the bathroom, felt light headed, called for  who was able to come to the room and catch her prior to her falling. Patient denies recent changes in medications, diet, weight loss. No chest pain, no facial trauma, no incontinence or tongue biting. Patient and  believe symptoms may be in setting of donepezil use. EKG is largely unremarkable, tele shows episodic sinus tach, no evidence of conduction disease. Orthostatic vitals signs were notable for an increase in heart rate from 70 to 100 with an increase in SBP from 130s to 160s with associated dizziness and instability.     PMHx:   Ulcer  Anxiety  Gastric lymphoma    PSHx:   No significant past surgical history    Allergies:  Sulfacetamide Sodium (Unknown)    Current Medications:   acetaminophen     Tablet .. 650 milliGRAM(s) Oral every 6 hours PRN  aluminum hydroxide/magnesium hydroxide/simethicone Suspension 30 milliLiter(s) Oral every 4 hours PRN  benzocaine/menthol Lozenge 1 Lozenge Oral every 4 hours PRN  donepezil 10 milliGRAM(s) Oral at bedtime  enoxaparin Injectable 40 milliGRAM(s) SubCutaneous every 24 hours  melatonin 3 milliGRAM(s) Oral at bedtime PRN  ondansetron Injectable 4 milliGRAM(s) IV Push every 8 hours PRN    FAMILY HISTORY:  No pertinent family history in first degree relatives    REVIEW OF SYSTEMS:  CONSTITUTIONAL: No weakness, fevers or chills  EYES/ENT: No visual changes;  No dysphagia  NECK: No pain or stiffness  RESPIRATORY: No cough, wheezing, hemoptysis; No shortness of breath  CARDIOVASCULAR: No chest pain or palpitations; No lower extremity edema  GASTROINTESTINAL: No abdominal or epigastric pain. No nausea, vomiting, or hematemesis; No diarrhea or constipation. No melena or hematochezia.  BACK: No back pain  GENITOURINARY: No dysuria, frequency or hematuria  NEUROLOGICAL: No numbness or weakness  SKIN: No itching, burning, rashes, or lesions   All other review of systems is negative unless indicated above.    Physical Exam:  T(F): 99.1 (07-07), Max: 99.1 (07-07)  HR: 66 (07-07) (60 - 113)  BP: 122/74 (07-07) (119/74 - 161/83)  RR: 18 (07-07)  SpO2: 96% (07-07)  GENERAL: No acute distress, well-developed  HEAD:  Atraumatic, Normocephalic  ENT: EOMI, PERRLA, conjunctiva and sclera clear, Neck supple, No JVD, moist mucosa  CHEST/LUNG: Clear to auscultation bilaterally; No wheeze, equal breath sounds bilaterally   BACK: No spinal tenderness  HEART: Regular rate and rhythm; No murmurs, rubs, or gallops  ABDOMEN: Soft, Nontender, Nondistended; Bowel sounds present  EXTREMITIES:  No clubbing, cyanosis, or edema  PSYCH: Nl behavior, nl affect  NEUROLOGY: AAOx3, non-focal, cranial nerves intact  SKIN: Normal color, No rashes or lesions  LINES:    Cardiovascular Diagnostic Testing:    ECG: Personally reviewed: NSR, nl ECG     Echo:  Normal left ventricular cavity size. The left ventricular systolic function is normal with an ejection fraction visually estimated at 70 %. There are no regional wall motion abnormalities seen.    CXR: Personally reviewed    Labs: Personally reviewed                        13.8   6.53  )-----------( 213      ( 07 Jul 2023 06:55 )             41.3     07-07    140  |  107  |  12  ----------------------------<  90  3.8   |  22  |  0.62    Ca    8.9      07 Jul 2023 06:58    TPro  6.6  /  Alb  4.0  /  TBili  0.3  /  DBili  x   /  AST  24  /  ALT  20  /  AlkPhos  82  07-06        CARDIAC MARKERS ( 05 Jul 2023 19:06 )  12 ng/L / x     / x     / x     / x     / x      CARDIAC MARKERS ( 05 Jul 2023 13:23 )  13 ng/L / x     / x     / x     / x     / x

## 2023-07-07 NOTE — DISCHARGE NOTE PROVIDER - NSDCFUSCHEDAPPT_GEN_ALL_CORE_FT
[facility] Physician [facility]  UROGYN [address]  Scheduled Appointment: 08/15/2023

## 2023-07-07 NOTE — PHYSICAL THERAPY INITIAL EVALUATION ADULT - ADDITIONAL COMMENTS
Pt resides in an apartment w/ spouse, no steps to enter, +elevator inside. PTA pt was independent w/ all functional mobility & did not use an AD for ambulation.

## 2023-07-08 LAB
CK SERPL-CCNC: 351 U/L — HIGH (ref 25–170)
CRP SERPL-MCNC: <3 MG/L — SIGNIFICANT CHANGE UP (ref 0–4)
ERYTHROCYTE [SEDIMENTATION RATE] IN BLOOD: 18 MM/HR — SIGNIFICANT CHANGE UP (ref 0–20)

## 2023-07-08 RX ADMIN — ONDANSETRON 4 MILLIGRAM(S): 8 TABLET, FILM COATED ORAL at 06:03

## 2023-07-08 RX ADMIN — DONEPEZIL HYDROCHLORIDE 10 MILLIGRAM(S): 10 TABLET, FILM COATED ORAL at 05:06

## 2023-07-08 RX ADMIN — ENOXAPARIN SODIUM 40 MILLIGRAM(S): 100 INJECTION SUBCUTANEOUS at 09:03

## 2023-07-08 NOTE — CHART NOTE - NSCHARTNOTEFT_GEN_A_CORE
case seen and staffed at bedside w/ attending Dr. iTan. Discussed CT findings w/ patient and . Would add routine EEG given history of syncope. Will continue to follow. Call [phone] with any questions

## 2023-07-08 NOTE — CHART NOTE - NSCHARTNOTEFT_GEN_A_CORE
Asked by Dr. Saab to contact to the family regarding LE weakness, MRI inpt vs outpt. Tried to reach pt's , Shamir Zapata 2x (11:40 am then 1728pm) [phone]. Awaiting call back. Will continue to monitor.    Marilu Leon NP  [phone]

## 2023-07-09 ENCOUNTER — TRANSCRIPTION ENCOUNTER (OUTPATIENT)
Age: 78
End: 2023-07-09

## 2023-07-09 VITALS
SYSTOLIC BLOOD PRESSURE: 142 MMHG | HEART RATE: 71 BPM | OXYGEN SATURATION: 97 % | RESPIRATION RATE: 18 BRPM | TEMPERATURE: 98 F | DIASTOLIC BLOOD PRESSURE: 66 MMHG

## 2023-07-09 PROCEDURE — 81001 URINALYSIS AUTO W/SCOPE: CPT

## 2023-07-09 PROCEDURE — 93005 ELECTROCARDIOGRAM TRACING: CPT

## 2023-07-09 PROCEDURE — 85652 RBC SED RATE AUTOMATED: CPT

## 2023-07-09 PROCEDURE — 86803 HEPATITIS C AB TEST: CPT

## 2023-07-09 PROCEDURE — 85027 COMPLETE CBC AUTOMATED: CPT

## 2023-07-09 PROCEDURE — 80048 BASIC METABOLIC PNL TOTAL CA: CPT

## 2023-07-09 PROCEDURE — 93306 TTE W/DOPPLER COMPLETE: CPT

## 2023-07-09 PROCEDURE — 97161 PT EVAL LOW COMPLEX 20 MIN: CPT

## 2023-07-09 PROCEDURE — 71045 X-RAY EXAM CHEST 1 VIEW: CPT

## 2023-07-09 PROCEDURE — 84484 ASSAY OF TROPONIN QUANT: CPT

## 2023-07-09 PROCEDURE — 80053 COMPREHEN METABOLIC PANEL: CPT

## 2023-07-09 PROCEDURE — 96374 THER/PROPH/DIAG INJ IV PUSH: CPT

## 2023-07-09 PROCEDURE — 82550 ASSAY OF CK (CPK): CPT

## 2023-07-09 PROCEDURE — 99285 EMERGENCY DEPT VISIT HI MDM: CPT | Mod: 25

## 2023-07-09 PROCEDURE — 70450 CT HEAD/BRAIN W/O DYE: CPT | Mod: MA

## 2023-07-09 PROCEDURE — 87086 URINE CULTURE/COLONY COUNT: CPT

## 2023-07-09 PROCEDURE — 86140 C-REACTIVE PROTEIN: CPT

## 2023-07-09 PROCEDURE — 85025 COMPLETE CBC W/AUTO DIFF WBC: CPT

## 2023-07-09 PROCEDURE — G0378: CPT

## 2023-07-09 PROCEDURE — 36415 COLL VENOUS BLD VENIPUNCTURE: CPT

## 2023-07-09 RX ORDER — DONEPEZIL HYDROCHLORIDE 10 MG/1
1 TABLET, FILM COATED ORAL
Qty: 30 | Refills: 0
Start: 2023-07-09 | End: 2023-08-07

## 2023-07-09 RX ORDER — DONEPEZIL HYDROCHLORIDE 10 MG/1
1 TABLET, FILM COATED ORAL
Refills: 0 | DISCHARGE

## 2023-07-09 RX ADMIN — ENOXAPARIN SODIUM 40 MILLIGRAM(S): 100 INJECTION SUBCUTANEOUS at 09:59

## 2023-07-09 RX ADMIN — DONEPEZIL HYDROCHLORIDE 10 MILLIGRAM(S): 10 TABLET, FILM COATED ORAL at 05:17

## 2023-07-09 NOTE — PROGRESS NOTE ADULT - REASON FOR ADMISSION
lightheadedness x 1 day

## 2023-07-09 NOTE — PROGRESS NOTE ADULT - ASSESSMENT
77 year old female w/pmhx of HTN, anxiety, AZ dementia ( A&Ox3) , h/o gastric maltoma s/p RTx ( at [facility])  presents for lightheadedness    #lightheadedness  -no ischemic changes to ecg  -reports lightheadedness when oob  -neg orthostatics  -ct head unremarkable   -echo with nl LV sys fx, no sign valvular disease   -no occult arrhythmias on tele   -reports also bl LE weakness- further imaging/ neuro work up  per med  -eps eval noted  -consider ext holter monitor on d/c    dcp per primary   pt to fu with her outpt cards     dvt ppx       35 minutes spent on total encounter; more than 50% of the visit was spent counseling and/or coordinating care by the attending physician.  
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     Plan:    # Syncope:  - CTH with no acute findings  - CXR with clear lungs  - Trops wnl  - Monitor on tele  - Fall precautions  - Check Orthostatics  - Neuro checks  - Echo pending  - May benefit from Holter monitoring on discharge    # HTN:  - Trend BP''s    # Anxiety:  - Stable. Continue to monitor    # Alzheimer dementia:  - C/w current meds    # Gastric lymphoma:  - Outpt Follow up    # GI ppx:  - Bowel regimen     # DVT ppx:  - Lovenox sq    [facility]    
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     Plan:    # Syncope:  - CTH with no acute findings  - CXR with clear lungs  - Trops wnl  - Monitor on tele  - Fall precautions  - Monitor Orthostatics  - Neuro checks  - Echo with EF 70%  - Cards and EP following-> rec'ed for biotel patch outpt    # LE weakness:  - PT eval-> rec'ed for Outpatient PT  - Seen by Neuro  - MRI inpt vs outpt    # HTN:  - Trend BP''s    # Anxiety:  - Stable. Continue to monitor    # Alzheimer dementia:  - C/w current meds    # Gastric lymphoma:  - Outpt Follow up    # GI ppx:  - Bowel regimen     # DVT ppx:  - Lovenox sq    [facility]  [phone]  
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     Plan:    # Syncope:  - CTH with no acute findings  - CXR with clear lungs  - Trops wnl  - Monitor on tele  - Fall precautions  - Monitor Orthostatics  - Neuro checks  - Echo with EF 70%  - Cards and EP following-> rec'ed for biotel patch outpt    # LE weakness:  - Seen by Neuro  - If improves towards baseline, then no contraindication to discharge from neurological standpoint  - If persists or worsens, can consider MR t/l spine for further evaluation    # HTN:  - Trend BP''s    # Anxiety:  - Stable. Continue to monitor    # Alzheimer dementia:  - C/w current meds    # Gastric lymphoma:  - Outpt Follow up    # GI ppx:  - Bowel regimen     # DVT ppx:  - Lovenox sq    [facility]  [phone]  
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     Plan:    # Syncope:  - CTH with no acute findings  - CXR with clear lungs  - Trops wnl  - Monitor on tele  - Fall precautions  - Monitor Orthostatics  - Neuro checks  - Echo with EF 70%  - Cards following    # HTN:  - Trend BP''s    # Anxiety:  - Stable. Continue to monitor    # Alzheimer dementia:  - C/w current meds    # Gastric lymphoma:  - Outpt Follow up    # GI ppx:  - Bowel regimen     # DVT ppx:  - Lovenox sq    [facility]  [phone]  
77 year old female w/pmhx of HTN, anxiety, AZ dementia ( A&Ox3) , h/o gastric maltoma s/p RTx ( at [facility])  presents for lightheadedness    #lightheadedness  -no ischemic changes to ecg  -reports lightheadedness when oob  -neg orthostatics  -ct head unremarkable   -echo with nl LV sys fx, no sign valvular disease   -no occult arrhythmias on tele   -reports also bl LE weakness- further imaging/ neuro work up  per med  -eps eval noted  -consider ext holter monitor on d/c  -eeg per neuro    dcp per primary   pt to fu with her outpt cards     dvt ppx       35 minutes spent on total encounter; more than 50% of the visit was spent counseling and/or coordinating care by the attending physician.  
77 year old female w/pmhx of HTN, anxiety, AZ dementia ( A&Ox3) , h/o gastric maltoma s/p RTx ( at [facility])  presents for lightheadedness    #lightheadedness  -no ischemic changes to ecg  -reports lightheadedness when oob  -neg orthostatics  -ct head unremarkable   -echo with nl LV sys fx, no sign valvular disease   -no occult arrhythmias on tele   -reports also bl LE weakness- further imaging/ neuro work up  per med  -per  recurrent syncopal episode with LOC a few weeks ago-- recommend EP eval for possible biotel patch     dcp per primary   pt to fu with her outpt cards     dvt ppx

## 2023-07-09 NOTE — PROGRESS NOTE ADULT - SUBJECTIVE AND OBJECTIVE BOX
CARDIOLOGY FOLLOW UP NOTE - DR. AMIN    Patient Name: JULIO C KEN  Date of Service: 07-09-23 @ 08:54      no new events      Subjective:    cv: denies chest pain, dyspnea, palpitations, dizziness  pulmonary: denies cough  GI: denies abdominal pain, nausea, vomiting  vascular/legs: no edema   skin: no rash  ROS: otherwise negative   overnight events:      PHYSICAL EXAM:  T(C): 37 (07-09-23 @ 04:42), Max: 37.2 (07-08-23 @ 21:31)  HR: 71 (07-09-23 @ 04:42) (71 - 83)  BP: 125/70 (07-09-23 @ 04:42) (112/67 - 125/70)  RR: 18 (07-09-23 @ 04:42) (18 - 18)  SpO2: 94% (07-09-23 @ 04:42) (94% - 96%)  Wt(kg): --  I&O's Summary    08 Jul 2023 07:01  -  09 Jul 2023 07:00  --------------------------------------------------------  IN: 1310 mL / OUT: 1 mL / NET: 1309 mL      Daily     Daily     Appearance: Normal	  Cardiovascular: Normal S1 S2,RRR, No JVD, No murmurs  Respiratory: Lungs clear to auscultation	  Gastrointestinal:  Soft, Non-tender, + BS	  Extremities: Normal range of motion, No clubbing, cyanosis or edema      Home Medications:  donepezil 10 mg oral tablet: 1 orally once a day (05 Jul 2023 20:39)      MEDICATIONS  (STANDING):  donepezil 10 milliGRAM(s) Oral <User Schedule>  enoxaparin Injectable 40 milliGRAM(s) SubCutaneous every 24 hours      TELEMETRY: 	    ECG:  	  RADIOLOGY:   DIAGNOSTIC TESTING:  [ ] Echocardiogram:  [ ] Catheterization:  [ ] Stress Test:    OTHER: 	    LABS:	 	    CARDIAC MARKERS:        Troponin T, High Sensitivity Result: 12 ng/L (07-05 @ 19:06)  Troponin T, High Sensitivity Result: 13 ng/L (07-05 @ 13:23)                  proBNP:     Lipid Profile:   HgA1c:     Creatinine: 0.62 mg/dL (07-07-23 @ 06:58)            
SUBJECTIVE / OVERNIGHT EVENTS:    Patient seen and examined at bedside. No events noted overnight. Resting comfortably in bed. + Nausea noted this am      --------------------------------------------------------------------------------------------  LABS:                        13.8   6.53  )-----------( 213      ( 07 Jul 2023 06:55 )             41.3     07-07    140  |  107  |  12  ----------------------------<  90  3.8   |  22  |  0.62    Ca    8.9      07 Jul 2023 06:58    TPro  6.6  /  Alb  4.0  /  TBili  0.3  /  DBili  x   /  AST  24  /  ALT  20  /  AlkPhos  82  07-06      CAPILLARY BLOOD GLUCOSE            Urinalysis Basic - ( 07 Jul 2023 06:58 )    Color: x / Appearance: x / SG: x / pH: x  Gluc: 90 mg/dL / Ketone: x  / Bili: x / Urobili: x   Blood: x / Protein: x / Nitrite: x   Leuk Esterase: x / RBC: x / WBC x   Sq Epi: x / Non Sq Epi: x / Bacteria: x        RADIOLOGY & ADDITIONAL TESTS:    Imaging Personally Reviewed:  [x] YES  [ ] NO    Consultant(s) Notes Reviewed:  [x] YES  [ ] NO    MEDICATIONS  (STANDING):  donepezil 10 milliGRAM(s) Oral <User Schedule>  enoxaparin Injectable 40 milliGRAM(s) SubCutaneous every 24 hours    MEDICATIONS  (PRN):  acetaminophen     Tablet .. 650 milliGRAM(s) Oral every 6 hours PRN Temp greater or equal to 38C (100.4F), Mild Pain (1 - 3)  aluminum hydroxide/magnesium hydroxide/simethicone Suspension 30 milliLiter(s) Oral every 4 hours PRN Dyspepsia  benzocaine/menthol Lozenge 1 Lozenge Oral every 4 hours PRN Sore Throat  melatonin 3 milliGRAM(s) Oral at bedtime PRN Insomnia  ondansetron Injectable 4 milliGRAM(s) IV Push every 8 hours PRN Nausea and/or Vomiting      Care Discussed with Consultants/Other Providers [x] YES  [ ] NO    Vital Signs Last 24 Hrs  T(C): 36.5 (08 Jul 2023 05:01), Max: 37.3 (07 Jul 2023 12:27)  T(F): 97.7 (08 Jul 2023 05:01), Max: 99.1 (07 Jul 2023 12:27)  HR: 55 (08 Jul 2023 05:01) (55 - 113)  BP: 114/72 (08 Jul 2023 05:01) (114/72 - 161/83)  BP(mean): --  RR: 18 (08 Jul 2023 05:01) (18 - 18)  SpO2: 97% (08 Jul 2023 05:01) (95% - 97%)    Parameters below as of 08 Jul 2023 05:01  Patient On (Oxygen Delivery Method): room air      I&O's Summary    06 Jul 2023 07:01  -  07 Jul 2023 07:00  --------------------------------------------------------  IN: 200 mL / OUT: 0 mL / NET: 200 mL    07 Jul 2023 07:01  -  08 Jul 2023 06:06  --------------------------------------------------------  IN: 920 mL / OUT: 400 mL / NET: 520 mL      PHYSICAL EXAM:  GENERAL: NAD, well-developed, Anxious at times  HEAD:  Atraumatic, Normocephalic  EYES: EOMI, PERRLA, conjunctiva and sclera clear  NECK: Supple, No JVD  CHEST/LUNG: Clear to auscultation bilaterally; No wheeze  HEART: Regular rate and rhythm; No murmurs, rubs, or gallops  ABDOMEN: Soft, Nontender, Nondistended; Bowel sounds present  NEURO: AAOx2, no focal weakness, 5/5 b/l extremity strength, b/l knee no arthritis, no effusion   EXTREMITIES:  2+ Peripheral Pulses, No clubbing, cyanosis, or edema  SKIN: No rashes or lesions  
CARDIOLOGY FOLLOW UP - Dr. Worrell  DATE OF SERVICE: 7/7/23    CC no cp or sob      REVIEW OF SYSTEMS:  CONSTITUTIONAL: No fever, weight loss, or fatigue  RESPIRATORY: No cough, wheezing, chills or hemoptysis; No Shortness of Breath  CARDIOVASCULAR: No chest pain, palpitations, passing out, dizziness, or leg swelling  GASTROINTESTINAL: No abdominal or epigastric pain. No nausea, vomiting, or hematemesis; No diarrhea or constipation. No melena or hematochezia.      PHYSICAL EXAM:  T(C): 36.2 (07-07-23 @ 09:32), Max: 36.9 (07-06-23 @ 21:30)  HR: 113 (07-07-23 @ 09:32) (60 - 113)  BP: 161/83 (07-07-23 @ 09:32) (119/74 - 161/83)  RR: 18 (07-07-23 @ 09:32) (18 - 18)  SpO2: 95% (07-07-23 @ 09:32) (95% - 97%)  Wt(kg): --  I&O's Summary    06 Jul 2023 07:01  -  07 Jul 2023 07:00  --------------------------------------------------------  IN: 200 mL / OUT: 0 mL / NET: 200 mL        Appearance: Normal	  Cardiovascular: Normal S1 S2,RRR, No JVD, No murmurs  Respiratory: Lungs clear to auscultation	  Gastrointestinal:  Soft, Non-tender, + BS	  Extremities: Normal range of motion, No clubbing, cyanosis or edema      Home Medications:  donepezil 10 mg oral tablet: 1 orally once a day (05 Jul 2023 20:39)      MEDICATIONS  (STANDING):  donepezil 10 milliGRAM(s) Oral at bedtime  enoxaparin Injectable 40 milliGRAM(s) SubCutaneous every 24 hours      TELEMETRY: nsr 	    ECG:  	  RADIOLOGY:   DIAGNOSTIC TESTING:  [ ] Echocardiogram:  < from: TTE W or WO Ultrasound Enhancing Agent (07.06.23 @ 14:08) >  CONCLUSIONS:      1. Normal left ventricular cavity size. The left ventricular systolic function is normal with an ejection fraction visually estimated at 70 %. There are no regional wall motion abnormalities seen.    < end of copied text >      [ ]  Catheterization:  [ ] Stress Test:    OTHER: 	    LABS:	 	    Troponin T, High Sensitivity Result: 12 ng/L [0 - 51] (07-05 @ 19:06)  Troponin T, High Sensitivity Result: 13 ng/L [0 - 51] (07-05 @ 13:23)                          13.8   6.53  )-----------( 213      ( 07 Jul 2023 06:55 )             41.3     07-07    140  |  107  |  12  ----------------------------<  90  3.8   |  22  |  0.62    Ca    8.9      07 Jul 2023 06:58    TPro  6.6  /  Alb  4.0  /  TBili  0.3  /  DBili  x   /  AST  24  /  ALT  20  /  AlkPhos  82  07-06            
SUBJECTIVE / OVERNIGHT EVENTS:      Patient seen and examined at bedside. No events noted overnight. Resting comfortably in bed    --------------------------------------------------------------------------------------------  LABS:                        13.8   6.53  )-----------( 213      ( 07 Jul 2023 06:55 )             41.3     07-07    140  |  107  |  12  ----------------------------<  90  3.8   |  22  |  0.62    Ca    8.9      07 Jul 2023 06:58        CAPILLARY BLOOD GLUCOSE        CARDIAC MARKERS ( 08 Jul 2023 07:03 )  x     / x     / 351 U/L / x     / x          Urinalysis Basic - ( 07 Jul 2023 06:58 )    Color: x / Appearance: x / SG: x / pH: x  Gluc: 90 mg/dL / Ketone: x  / Bili: x / Urobili: x   Blood: x / Protein: x / Nitrite: x   Leuk Esterase: x / RBC: x / WBC x   Sq Epi: x / Non Sq Epi: x / Bacteria: x        RADIOLOGY & ADDITIONAL TESTS:    Imaging Personally Reviewed:  [x] YES  [ ] NO    Consultant(s) Notes Reviewed:  [x] YES  [ ] NO    MEDICATIONS  (STANDING):  donepezil 10 milliGRAM(s) Oral <User Schedule>  enoxaparin Injectable 40 milliGRAM(s) SubCutaneous every 24 hours    MEDICATIONS  (PRN):  acetaminophen     Tablet .. 650 milliGRAM(s) Oral every 6 hours PRN Temp greater or equal to 38C (100.4F), Mild Pain (1 - 3)  aluminum hydroxide/magnesium hydroxide/simethicone Suspension 30 milliLiter(s) Oral every 4 hours PRN Dyspepsia  benzocaine/menthol Lozenge 1 Lozenge Oral every 4 hours PRN Sore Throat  melatonin 3 milliGRAM(s) Oral at bedtime PRN Insomnia  ondansetron Injectable 4 milliGRAM(s) IV Push every 8 hours PRN Nausea and/or Vomiting      Care Discussed with Consultants/Other Providers [x] YES  [ ] NO    Vital Signs Last 24 Hrs  T(C): 37 (09 Jul 2023 04:42), Max: 37.2 (08 Jul 2023 21:31)  T(F): 98.6 (09 Jul 2023 04:42), Max: 98.9 (08 Jul 2023 21:31)  HR: 71 (09 Jul 2023 04:42) (71 - 83)  BP: 125/70 (09 Jul 2023 04:42) (112/67 - 125/70)  BP(mean): --  RR: 18 (09 Jul 2023 04:42) (18 - 18)  SpO2: 94% (09 Jul 2023 04:42) (94% - 96%)    Parameters below as of 09 Jul 2023 04:42  Patient On (Oxygen Delivery Method): room air      I&O's Summary    07 Jul 2023 07:01  -  08 Jul 2023 07:00  --------------------------------------------------------  IN: 920 mL / OUT: 400 mL / NET: 520 mL    08 Jul 2023 07:01  -  09 Jul 2023 06:45  --------------------------------------------------------  IN: 830 mL / OUT: 0 mL / NET: 830 mL          PHYSICAL EXAM:  GENERAL: NAD, well-developed, Anxious at times  HEAD:  Atraumatic, Normocephalic  EYES: EOMI, PERRLA, conjunctiva and sclera clear  NECK: Supple, No JVD  CHEST/LUNG: Clear to auscultation bilaterally; No wheeze  HEART: Regular rate and rhythm; No murmurs, rubs, or gallops  ABDOMEN: Soft, Nontender, Nondistended; Bowel sounds present  NEURO: AAOx2, no focal weakness, b/l knee no arthritis, no effusion   EXTREMITIES:  2+ Peripheral Pulses, No clubbing, cyanosis, or edema  SKIN: No rashes or lesions
SUBJECTIVE / OVERNIGHT EVENTS:    Patient seen and examined at bedside. No events noted overnight. Resting comfortably in bed    --------------------------------------------------------------------------------------------  LABS:                        13.8   6.55  )-----------( 208      ( 06 Jul 2023 07:04 )             40.9     07-06    141  |  107  |  14  ----------------------------<  93  3.5   |  22  |  0.59    Ca    9.1      06 Jul 2023 07:04    TPro  6.6  /  Alb  4.0  /  TBili  0.3  /  DBili  x   /  AST  24  /  ALT  20  /  AlkPhos  82  07-06      CAPILLARY BLOOD GLUCOSE        Urinalysis Basic - ( 06 Jul 2023 07:04 )    Color: x / Appearance: x / SG: x / pH: x  Gluc: 93 mg/dL / Ketone: x  / Bili: x / Urobili: x   Blood: x / Protein: x / Nitrite: x   Leuk Esterase: x / RBC: x / WBC x   Sq Epi: x / Non Sq Epi: x / Bacteria: x        RADIOLOGY & ADDITIONAL TESTS:< from: Xray Chest 1 View- PORTABLE-Urgent (07.05.23 @ 13:44) >  IMPRESSION:    Clear lungs.    --- End of Report ---    < end of copied text >  < from: CT Head No Cont (07.05.23 @ 16:31) >  IMPRESSION:  No acute intracranial hemorrhage oracute territorial infarct.  If   symptoms persist, follow-up MRI exam recommended.    --- End of Report ---    < end of copied text >      Imaging Personally Reviewed:  [x] YES  [ ] NO    Consultant(s) Notes Reviewed:  [x] YES  [ ] NO    MEDICATIONS  (STANDING):  donepezil 10 milliGRAM(s) Oral at bedtime  enoxaparin Injectable 40 milliGRAM(s) SubCutaneous every 24 hours    MEDICATIONS  (PRN):  acetaminophen     Tablet .. 650 milliGRAM(s) Oral every 6 hours PRN Temp greater or equal to 38C (100.4F), Mild Pain (1 - 3)  aluminum hydroxide/magnesium hydroxide/simethicone Suspension 30 milliLiter(s) Oral every 4 hours PRN Dyspepsia  melatonin 3 milliGRAM(s) Oral at bedtime PRN Insomnia  ondansetron Injectable 4 milliGRAM(s) IV Push every 8 hours PRN Nausea and/or Vomiting      Care Discussed with Consultants/Other Providers [x] YES  [ ] NO    Vital Signs Last 24 Hrs  T(C): 36.6 (06 Jul 2023 05:31), Max: 37 (05 Jul 2023 23:15)  T(F): 97.9 (06 Jul 2023 05:31), Max: 98.6 (05 Jul 2023 23:15)  HR: 62 (06 Jul 2023 05:31) (62 - 70)  BP: 137/78 (06 Jul 2023 05:31) (111/52 - 160/70)  BP(mean): --  RR: 18 (06 Jul 2023 05:31) (18 - 20)  SpO2: 98% (06 Jul 2023 05:31) (96% - 99%)    Parameters below as of 06 Jul 2023 05:31  Patient On (Oxygen Delivery Method): room air      I&O's Summary      PHYSICAL EXAM:  GENERAL: NAD, well-developed, comfortable  HEAD:  Atraumatic, Normocephalic  EYES: EOMI, PERRLA, conjunctiva and sclera clear  NECK: Supple, No JVD  CHEST/LUNG: Clear to auscultation bilaterally; No wheeze  HEART: Regular rate and rhythm; No murmurs, rubs, or gallops  ABDOMEN: Soft, Nontender, Nondistended; Bowel sounds present  NEURO: AAOx3, no focal weakness, 5/5 b/l extremity strength, b/l knee no arthritis, no effusion   EXTREMITIES:  2+ Peripheral Pulses, No clubbing, cyanosis, or edema  SKIN: No rashes or lesions    
CARDIOLOGY FOLLOW UP NOTE - DR. AMIN    Patient Name: JULIO C KEN  Date of Service: 07-08-23 @ 08:30    Patient seen and examined    Subjective:    cv: denies chest pain, dyspnea, palpitations, dizziness  pulmonary: denies cough  GI: denies abdominal pain, nausea, vomiting  vascular/legs: no edema   skin: no rash  ROS: otherwise negative   overnight events:      PHYSICAL EXAM:  T(C): 36.5 (07-08-23 @ 05:01), Max: 37.3 (07-07-23 @ 12:27)  HR: 55 (07-08-23 @ 05:01) (55 - 113)  BP: 114/72 (07-08-23 @ 05:01) (114/72 - 161/83)  RR: 18 (07-08-23 @ 05:01) (18 - 18)  SpO2: 97% (07-08-23 @ 05:01) (95% - 97%)  Wt(kg): --  I&O's Summary    07 Jul 2023 07:01  -  08 Jul 2023 07:00  --------------------------------------------------------  IN: 920 mL / OUT: 400 mL / NET: 520 mL      Daily     Daily     Appearance: Normal	  Cardiovascular: Normal S1 S2,RRR, No JVD, No murmurs  Respiratory: Lungs clear to auscultation	  Gastrointestinal:  Soft, Non-tender, + BS	  Extremities: Normal range of motion, No clubbing, cyanosis or edema      Home Medications:  donepezil 10 mg oral tablet: 1 orally once a day (05 Jul 2023 20:39)      MEDICATIONS  (STANDING):  donepezil 10 milliGRAM(s) Oral <User Schedule>  enoxaparin Injectable 40 milliGRAM(s) SubCutaneous every 24 hours      TELEMETRY: 	    ECG:  	  RADIOLOGY:   DIAGNOSTIC TESTING:  [ ] Echocardiogram:  [ ] Catheterization:  [ ] Stress Test:    OTHER: 	    LABS:	 	    CARDIAC MARKERS:        Troponin T, High Sensitivity Result: 12 ng/L (07-05 @ 19:06)  Troponin T, High Sensitivity Result: 13 ng/L (07-05 @ 13:23)                                13.8   6.53  )-----------( 213      ( 07 Jul 2023 06:55 )             41.3     07-07    140  |  107  |  12  ----------------------------<  90  3.8   |  22  |  0.62    Ca    8.9      07 Jul 2023 06:58      proBNP:     Lipid Profile:   HgA1c:     Creatinine: 0.62 mg/dL (07-07-23 @ 06:58)  Creatinine: 0.59 mg/dL (07-06-23 @ 07:04)  Creatinine: 0.67 mg/dL (07-05-23 @ 13:23)            
SUBJECTIVE / OVERNIGHT EVENTS:      Patient seen and examined at bedside. No events noted overnight. Resting in bed. Confused and anxious about her hospital stay    --------------------------------------------------------------------------------------------  LABS:                        13.8   6.53  )-----------( 213      ( 07 Jul 2023 06:55 )             41.3     07-07    140  |  107  |  12  ----------------------------<  90  3.8   |  22  |  0.62    Ca    8.9      07 Jul 2023 06:58    TPro  6.6  /  Alb  4.0  /  TBili  0.3  /  DBili  x   /  AST  24  /  ALT  20  /  AlkPhos  82  07-06      CAPILLARY BLOOD GLUCOSE            Urinalysis Basic - ( 07 Jul 2023 06:58 )    Color: x / Appearance: x / SG: x / pH: x  Gluc: 90 mg/dL / Ketone: x  / Bili: x / Urobili: x   Blood: x / Protein: x / Nitrite: x   Leuk Esterase: x / RBC: x / WBC x   Sq Epi: x / Non Sq Epi: x / Bacteria: x        RADIOLOGY & ADDITIONAL TESTS:< from: TTE W or WO Ultrasound Enhancing Agent (07.06.23 @ 14:08) >     1. Normal left ventricular cavity size. The left ventricular systolic function is normal with an ejection fraction visually estimated at 70 %. There are no regional wall motion abnormalities seen.    < end of copied text >      Imaging Personally Reviewed:  [x] YES  [ ] NO    Consultant(s) Notes Reviewed:  [x] YES  [ ] NO    MEDICATIONS  (STANDING):  donepezil 10 milliGRAM(s) Oral at bedtime  enoxaparin Injectable 40 milliGRAM(s) SubCutaneous every 24 hours    MEDICATIONS  (PRN):  acetaminophen     Tablet .. 650 milliGRAM(s) Oral every 6 hours PRN Temp greater or equal to 38C (100.4F), Mild Pain (1 - 3)  aluminum hydroxide/magnesium hydroxide/simethicone Suspension 30 milliLiter(s) Oral every 4 hours PRN Dyspepsia  benzocaine/menthol Lozenge 1 Lozenge Oral every 4 hours PRN Sore Throat  melatonin 3 milliGRAM(s) Oral at bedtime PRN Insomnia  ondansetron Injectable 4 milliGRAM(s) IV Push every 8 hours PRN Nausea and/or Vomiting      Care Discussed with Consultants/Other Providers [x] YES  [ ] NO    Vital Signs Last 24 Hrs  T(C): 36.2 (07 Jul 2023 09:32), Max: 36.9 (06 Jul 2023 21:30)  T(F): 97.1 (07 Jul 2023 09:32), Max: 98.5 (06 Jul 2023 21:30)  HR: 113 (07 Jul 2023 09:32) (60 - 113)  BP: 161/83 (07 Jul 2023 09:32) (119/74 - 161/83)  BP(mean): --  RR: 18 (07 Jul 2023 09:32) (18 - 18)  SpO2: 95% (07 Jul 2023 09:32) (95% - 97%)    Parameters below as of 07 Jul 2023 09:32  Patient On (Oxygen Delivery Method): room air      I&O's Summary    06 Jul 2023 07:01  -  07 Jul 2023 07:00  --------------------------------------------------------  IN: 200 mL / OUT: 0 mL / NET: 200 mL      PHYSICAL EXAM:  GENERAL: NAD, well-developed, Anxious  HEAD:  Atraumatic, Normocephalic  EYES: EOMI, PERRLA, conjunctiva and sclera clear  NECK: Supple, No JVD  CHEST/LUNG: Clear to auscultation bilaterally; No wheeze  HEART: Regular rate and rhythm; No murmurs, rubs, or gallops  ABDOMEN: Soft, Nontender, Nondistended; Bowel sounds present  NEURO: AAOx2, no focal weakness, 5/5 b/l extremity strength, b/l knee no arthritis, no effusion   EXTREMITIES:  2+ Peripheral Pulses, No clubbing, cyanosis, or edema  SKIN: No rashes or lesions

## 2023-07-09 NOTE — DISCHARGE NOTE NURSING/CASE MANAGEMENT/SOCIAL WORK - PATIENT PORTAL LINK FT
You can access the FollowMyHealth Patient Portal offered by [facility] by registering at the following website: [email or web address]. By joining [facility]’s FollowMyHealth portal, you will also be able to view your health information using other applications (apps) compatible with our system.

## 2023-07-09 NOTE — PROGRESS NOTE ADULT - PROVIDER SPECIALTY LIST ADULT
Cardiology
Cardiology
Internal Medicine
Cardiology
Internal Medicine

## 2023-08-15 ENCOUNTER — APPOINTMENT (OUTPATIENT)
Dept: UROGYNECOLOGY | Facility: CLINIC | Age: 78
End: 2023-08-15
Payer: MEDICARE

## 2023-08-15 PROBLEM — C85.99 NON-HODGKIN LYMPHOMA, UNSPECIFIED, EXTRANODAL AND SOLID ORGAN SITES: Chronic | Status: ACTIVE | Noted: 2023-07-05

## 2023-08-15 PROCEDURE — 99213 OFFICE O/P EST LOW 20 MIN: CPT

## 2023-08-15 NOTE — PHYSICAL EXAM
[Chaperone Present] : A chaperone was present in the examining room during all aspects of the physical examination [No Acute Distress] : in no acute distress [Well developed] : well developed [Well Nourished] : ~L well nourished [Oriented x3] : oriented to person, place, and time [Normal Memory] : ~T memory was ~L unimpaired [Normal Mood/Affect] : mood and affect are normal [Anxiety] : patient is anxious [Warm and Dry] : was warm and dry to touch [Normal Gait] : gait was normal [Labia Majora] : were normal [Labia Minora] : were normal [Normal] : was normal [Atrophy] : atrophy [Erythematous] : erythema [Discharge] : a  ~M vaginal discharge was present [Scant] : scant [Mary] : yellow [Thin] : thin [No Bleeding] : there was no active vaginal bleeding

## 2023-08-28 NOTE — HISTORY OF PRESENT ILLNESS
[FreeTextEntry1] : Amina is a 76yo with POP supported with RS#6 who presents today for follow up. She denies any complaints and continues to be satisfied with pessary.  Denies any vaginal bleeding, incomplete emptying, difficulty with bowel movement.  She does not use any vaginal lubricant.

## 2023-08-28 NOTE — PROCEDURE
[Good Fit] : fits well [Erythema] : erythema noted [Discharge] : there is vaginal discharge [Pessary Inserted] : inserted [Pessary Washed] : washed [None] : no bleeding [Refit] : refit is not needed [Erosion] : no evidence of erosion [Infection] : no evidence of infection [FreeTextEntry1] : RS #6  [de-identified] : granulation tissue present [FreeTextEntry8] : Reinforced perineal care

## 2023-08-28 NOTE — DISCUSSION/SUMMARY
[FreeTextEntry1] : # POP: - Remains satisfied with RS#6 - Recommended she start using Replens or Luvena 2-3 times/week at bedtime - Routine perineal care reviewed - RTO in 3 months or sooner, as needed   All questions answered to the patient's satisfaction.  She expressed understanding. She knows to contact the office with any questions or concerns.

## 2023-10-11 NOTE — ED ADULT TRIAGE NOTE - NSSEPSISSUSPECTED_ED_A_ED
SURVEY:    You may be receiving a survey from [employer] regarding your visit today. Please complete the survey to enable us to provide the highest quality of care to you and your family. If you cannot score us a very good on any question, please call the office to discuss how we could have made your experience a very good one. Thank you. No

## 2023-11-16 NOTE — ED ADULT TRIAGE NOTE - WEIGHT IN LBS
119.9 Discussion: The nature of the lesion(s) was discussed.  Questions were answered.  I recommend surgery as appropriate treatment due to the site, size and histologic type of the tumor.  Surgery will mitigate risk of poor outcomes due to recurrence.  The procedure, morbidity (bruising, swelling, wound care, bandaging), activity restrictions, time off work and complications (bleeding, infection, scarring) were reviewed.  The potential appearance of the scar was discussed.  Questions were answered.  Their procedure was performed today, see dictation Date Of Surgery - Today Or Tomorrow?: today Risk Assessment Explanation (Does Not Render In The Note): Clinical determination of the probability and/or consequences of an event, such as surgery. Clinical assessment of the level of risk is affected by the nature of the event under consideration for the patient. Modifier 57 is used to indicate an Evaluation and Management (E/M) service resulted in the initial decision to perform surgery either the day before a major surgery (90 day global) or the day of a major surgery. Initial Decision For Surgery?: No

## 2023-11-28 ENCOUNTER — APPOINTMENT (OUTPATIENT)
Dept: UROGYNECOLOGY | Facility: CLINIC | Age: 78
End: 2023-11-28
Payer: MEDICARE

## 2023-11-28 PROCEDURE — 99213 OFFICE O/P EST LOW 20 MIN: CPT

## 2024-04-23 ENCOUNTER — APPOINTMENT (OUTPATIENT)
Dept: UROGYNECOLOGY | Facility: CLINIC | Age: 79
End: 2024-04-23

## 2024-04-30 ENCOUNTER — APPOINTMENT (OUTPATIENT)
Dept: UROGYNECOLOGY | Facility: CLINIC | Age: 79
End: 2024-04-30
Payer: MEDICARE

## 2024-04-30 VITALS
HEART RATE: 90 BPM | HEIGHT: 66 IN | RESPIRATION RATE: 90 BRPM | SYSTOLIC BLOOD PRESSURE: 160 MMHG | WEIGHT: 130 LBS | DIASTOLIC BLOOD PRESSURE: 70 MMHG | BODY MASS INDEX: 20.89 KG/M2

## 2024-04-30 DIAGNOSIS — N81.2 INCOMPLETE UTEROVAGINAL PROLAPSE: ICD-10-CM

## 2024-04-30 DIAGNOSIS — N81.6 RECTOCELE: ICD-10-CM

## 2024-04-30 DIAGNOSIS — N81.11 CYSTOCELE, MIDLINE: ICD-10-CM

## 2024-04-30 DIAGNOSIS — N95.2 POSTMENOPAUSAL ATROPHIC VAGINITIS: ICD-10-CM

## 2024-04-30 PROCEDURE — 99213 OFFICE O/P EST LOW 20 MIN: CPT

## 2024-04-30 PROCEDURE — G2211 COMPLEX E/M VISIT ADD ON: CPT

## 2024-05-06 NOTE — HISTORY OF PRESENT ILLNESS
[FreeTextEntry1] : Amina is a 79 yo with POP supported with RS#6 who presents today for follow up. She denies any complaints and continues to be satisfied with pessary.  Denies any vaginal bleeding, incomplete emptying, difficulty with bowel movement.  She does not use any vaginal lubricant.

## 2024-05-06 NOTE — PHYSICAL EXAM
[No Acute Distress] : in no acute distress [Well developed] : well developed [Well Nourished] : ~L well nourished [Oriented x3] : oriented to person, place, and time [Normal Memory] : ~T memory was ~L unimpaired [Normal Mood/Affect] : mood and affect are normal [Anxiety] : patient is anxious [Warm and Dry] : was warm and dry to touch [Normal Gait] : gait was normal [Labia Majora] : were normal [Labia Minora] : were normal [Normal] : was normal [Atrophy] : atrophy [Erythematous] : erythema [Discharge] : a  ~M vaginal discharge was present [Scant] : scant [Mary] : yellow [Thin] : thin [No Bleeding] : there was no active vaginal bleeding [FreeTextEntry4] : granulation tissue posteriorly

## 2024-05-06 NOTE — PROCEDURE
[Good Fit] : fits well [Erythema] : erythema noted [Discharge] : there is vaginal discharge [Pessary Inserted] : inserted [Pessary Washed] : washed [None] : no bleeding [Refit] : refit is not needed [Erosion] : no evidence of erosion [Infection] : no evidence of infection [FreeTextEntry1] : RS #6  [de-identified] : granulation tissue present [FreeTextEntry8] : Reinforced perineal care

## 2024-08-06 ENCOUNTER — APPOINTMENT (OUTPATIENT)
Dept: UROGYNECOLOGY | Facility: CLINIC | Age: 79
End: 2024-08-06

## 2024-08-06 PROCEDURE — 99213 OFFICE O/P EST LOW 20 MIN: CPT

## 2024-08-06 PROCEDURE — G2211 COMPLEX E/M VISIT ADD ON: CPT

## 2024-08-09 NOTE — PROCEDURE
[Good Fit] : fits well [Erythema] : erythema noted [Discharge] : there is vaginal discharge [Pessary Inserted] : inserted [Pessary Washed] : washed [None] : no bleeding [Refit] : refit is not needed [Erosion] : no evidence of erosion [Infection] : no evidence of infection [FreeTextEntry1] : RS #6  [de-identified] : granulation tissue present [FreeTextEntry8] : Reinforced perineal care

## 2024-08-09 NOTE — HISTORY OF PRESENT ILLNESS
[FreeTextEntry1] : Amina is a 77 yo with POP supported with RS#6 who presents today for follow up. She denies any complaints and continues to be satisfied with pessary.  Denies any vaginal bleeding, incomplete emptying, difficulty with bowel movement.  She does not use any vaginal lubricant.

## 2024-08-09 NOTE — PHYSICAL EXAM
[No Acute Distress] : in no acute distress [Well developed] : well developed [Well Nourished] : ~L well nourished [Oriented x3] : oriented to person, place, and time [Normal Memory] : ~T memory was ~L unimpaired [Normal Mood/Affect] : mood and affect are normal [Anxiety] : patient is anxious [Warm and Dry] : was warm and dry to touch [Normal Gait] : gait was normal [Labia Majora] : were normal [Labia Minora] : were normal [Normal] : was normal [Atrophy] : atrophy [Erythematous] : erythema [Discharge] : a  ~M vaginal discharge was present [Scant] : scant [Mary] : yellow [Thin] : thin [No Bleeding] : there was no active vaginal bleeding [FreeTextEntry4] : granulation tissue present

## 2024-08-09 NOTE — PROCEDURE
[Good Fit] : fits well [Erythema] : erythema noted [Discharge] : there is vaginal discharge [Pessary Inserted] : inserted [Pessary Washed] : washed [None] : no bleeding [Refit] : refit is not needed [Erosion] : no evidence of erosion [Infection] : no evidence of infection [FreeTextEntry1] : RS #6  [de-identified] : granulation tissue present [FreeTextEntry8] : Reinforced perineal care

## 2024-08-17 NOTE — ED ADULT NURSE NOTE - NS ED NURSE LEVEL OF CONSCIOUSNESS ORIENTATION
Oriented - self; Oriented - place; Oriented - time
FAMILY HISTORY:  Family history of pancreatic cancer    Father  Still living? Unknown  Family history of diabetes mellitus in father, Age at diagnosis: Age Unknown

## 2024-11-20 ENCOUNTER — APPOINTMENT (OUTPATIENT)
Dept: UROGYNECOLOGY | Facility: CLINIC | Age: 79
End: 2024-11-20
Payer: MEDICARE

## 2024-11-20 DIAGNOSIS — N95.2 POSTMENOPAUSAL ATROPHIC VAGINITIS: ICD-10-CM

## 2024-11-20 DIAGNOSIS — N81.6 RECTOCELE: ICD-10-CM

## 2024-11-20 DIAGNOSIS — N81.2 INCOMPLETE UTEROVAGINAL PROLAPSE: ICD-10-CM

## 2024-11-20 DIAGNOSIS — N81.11 CYSTOCELE, MIDLINE: ICD-10-CM

## 2024-11-20 PROCEDURE — 99213 OFFICE O/P EST LOW 20 MIN: CPT

## 2024-11-20 PROCEDURE — G2211 COMPLEX E/M VISIT ADD ON: CPT

## 2024-11-20 PROCEDURE — 99459 PELVIC EXAMINATION: CPT

## 2025-05-28 ENCOUNTER — APPOINTMENT (OUTPATIENT)
Dept: UROGYNECOLOGY | Facility: CLINIC | Age: 80
End: 2025-05-28
Payer: MEDICARE

## 2025-05-28 DIAGNOSIS — N81.2 INCOMPLETE UTEROVAGINAL PROLAPSE: ICD-10-CM

## 2025-05-28 DIAGNOSIS — N95.2 POSTMENOPAUSAL ATROPHIC VAGINITIS: ICD-10-CM

## 2025-05-28 DIAGNOSIS — N81.6 RECTOCELE: ICD-10-CM

## 2025-05-28 DIAGNOSIS — N81.11 CYSTOCELE, MIDLINE: ICD-10-CM

## 2025-05-28 PROCEDURE — 99213 OFFICE O/P EST LOW 20 MIN: CPT

## 2025-05-28 PROCEDURE — G2211 COMPLEX E/M VISIT ADD ON: CPT

## 2025-08-14 ENCOUNTER — APPOINTMENT (OUTPATIENT)
Dept: OPHTHALMOLOGY | Facility: CLINIC | Age: 80
End: 2025-08-14